# Patient Record
Sex: MALE | Race: WHITE | NOT HISPANIC OR LATINO | Employment: FULL TIME | URBAN - METROPOLITAN AREA
[De-identification: names, ages, dates, MRNs, and addresses within clinical notes are randomized per-mention and may not be internally consistent; named-entity substitution may affect disease eponyms.]

---

## 2024-07-23 ENCOUNTER — OFFICE VISIT (OUTPATIENT)
Dept: INTERNAL MEDICINE | Facility: CLINIC | Age: 34
End: 2024-07-23
Payer: COMMERCIAL

## 2024-07-23 VITALS
TEMPERATURE: 97 F | HEART RATE: 79 BPM | HEIGHT: 75 IN | WEIGHT: 315 LBS | DIASTOLIC BLOOD PRESSURE: 79 MMHG | OXYGEN SATURATION: 95 % | SYSTOLIC BLOOD PRESSURE: 138 MMHG | RESPIRATION RATE: 20 BRPM | BODY MASS INDEX: 39.17 KG/M2

## 2024-07-23 DIAGNOSIS — I10 HYPERTENSION, UNSPECIFIED TYPE: Primary | ICD-10-CM

## 2024-07-23 DIAGNOSIS — M54.41 CHRONIC RIGHT-SIDED LOW BACK PAIN WITH BILATERAL SCIATICA: ICD-10-CM

## 2024-07-23 DIAGNOSIS — G89.29 CHRONIC RIGHT-SIDED LOW BACK PAIN WITH BILATERAL SCIATICA: ICD-10-CM

## 2024-07-23 DIAGNOSIS — M54.42 CHRONIC RIGHT-SIDED LOW BACK PAIN WITH BILATERAL SCIATICA: ICD-10-CM

## 2024-07-23 DIAGNOSIS — E66.01 CLASS 3 SEVERE OBESITY WITH SERIOUS COMORBIDITY AND BODY MASS INDEX (BMI) OF 60.0 TO 69.9 IN ADULT, UNSPECIFIED OBESITY TYPE: ICD-10-CM

## 2024-07-23 DIAGNOSIS — Z00.00 ROUTINE ADULT HEALTH MAINTENANCE: ICD-10-CM

## 2024-07-23 DIAGNOSIS — M25.511 ACUTE PAIN OF RIGHT SHOULDER: ICD-10-CM

## 2024-07-23 DIAGNOSIS — M79.89 LEG SWELLING: ICD-10-CM

## 2024-07-23 PROBLEM — N35.919 URETHRAL STRICTURE IN MALE: Status: ACTIVE | Noted: 2021-07-21

## 2024-07-23 PROBLEM — M54.50 CHRONIC RIGHT-SIDED LOW BACK PAIN WITHOUT SCIATICA: Status: ACTIVE | Noted: 2021-10-25

## 2024-07-23 PROBLEM — Z87.442 PERSONAL HISTORY OF KIDNEY STONES: Status: ACTIVE | Noted: 2021-03-29

## 2024-07-23 PROCEDURE — 4010F ACE/ARB THERAPY RXD/TAKEN: CPT | Mod: CPTII,S$GLB,, | Performed by: FAMILY MEDICINE

## 2024-07-23 PROCEDURE — 3075F SYST BP GE 130 - 139MM HG: CPT | Mod: CPTII,S$GLB,, | Performed by: FAMILY MEDICINE

## 2024-07-23 PROCEDURE — 99999 PR PBB SHADOW E&M-NEW PATIENT-LVL V: CPT | Mod: PBBFAC,,, | Performed by: FAMILY MEDICINE

## 2024-07-23 PROCEDURE — 99204 OFFICE O/P NEW MOD 45 MIN: CPT | Mod: S$GLB,,, | Performed by: FAMILY MEDICINE

## 2024-07-23 PROCEDURE — 3078F DIAST BP <80 MM HG: CPT | Mod: CPTII,S$GLB,, | Performed by: FAMILY MEDICINE

## 2024-07-23 PROCEDURE — 3008F BODY MASS INDEX DOCD: CPT | Mod: CPTII,S$GLB,, | Performed by: FAMILY MEDICINE

## 2024-07-23 PROCEDURE — 1159F MED LIST DOCD IN RCRD: CPT | Mod: CPTII,S$GLB,, | Performed by: FAMILY MEDICINE

## 2024-07-23 PROCEDURE — 3044F HG A1C LEVEL LT 7.0%: CPT | Mod: CPTII,S$GLB,, | Performed by: FAMILY MEDICINE

## 2024-07-23 PROCEDURE — G2211 COMPLEX E/M VISIT ADD ON: HCPCS | Mod: S$GLB,,, | Performed by: FAMILY MEDICINE

## 2024-07-23 RX ORDER — GABAPENTIN 300 MG/1
300 CAPSULE ORAL 3 TIMES DAILY
Qty: 90 CAPSULE | Refills: 1 | Status: SHIPPED | OUTPATIENT
Start: 2024-07-23 | End: 2025-07-23

## 2024-07-23 RX ORDER — OLMESARTAN MEDOXOMIL 20 MG/1
20 TABLET ORAL DAILY
COMMUNITY

## 2024-07-23 RX ORDER — HYDROCODONE BITARTRATE AND ACETAMINOPHEN 10; 325 MG/1; MG/1
1 TABLET ORAL EVERY 8 HOURS PRN
Qty: 24 TABLET | Refills: 0 | Status: SHIPPED | OUTPATIENT
Start: 2024-07-23

## 2024-07-23 RX ORDER — METHOCARBAMOL 500 MG/1
500 TABLET, FILM COATED ORAL DAILY PRN
COMMUNITY

## 2024-07-23 RX ORDER — TIRZEPATIDE 2.5 MG/.5ML
2.5 INJECTION, SOLUTION SUBCUTANEOUS
Qty: 4 PEN | Refills: 1 | Status: SHIPPED | OUTPATIENT
Start: 2024-07-23

## 2024-07-24 ENCOUNTER — HOSPITAL ENCOUNTER (OUTPATIENT)
Dept: RADIOLOGY | Facility: HOSPITAL | Age: 34
Discharge: HOME | End: 2024-07-24
Attending: FAMILY MEDICINE
Payer: COMMERCIAL

## 2024-07-24 DIAGNOSIS — M54.42 CHRONIC RIGHT-SIDED LOW BACK PAIN WITH BILATERAL SCIATICA: ICD-10-CM

## 2024-07-24 DIAGNOSIS — G89.29 CHRONIC RIGHT-SIDED LOW BACK PAIN WITH BILATERAL SCIATICA: ICD-10-CM

## 2024-07-24 DIAGNOSIS — M25.511 ACUTE PAIN OF RIGHT SHOULDER: ICD-10-CM

## 2024-07-24 DIAGNOSIS — M54.41 CHRONIC RIGHT-SIDED LOW BACK PAIN WITH BILATERAL SCIATICA: ICD-10-CM

## 2024-07-24 PROCEDURE — 73030 X-RAY EXAM OF SHOULDER: CPT | Mod: 26,RT,, | Performed by: RADIOLOGY

## 2024-07-24 PROCEDURE — 72110 X-RAY EXAM L-2 SPINE 4/>VWS: CPT | Mod: TC,PO

## 2024-07-24 PROCEDURE — 72110 X-RAY EXAM L-2 SPINE 4/>VWS: CPT | Mod: 26,,, | Performed by: RADIOLOGY

## 2024-07-24 PROCEDURE — 73030 X-RAY EXAM OF SHOULDER: CPT | Mod: TC,PO,RT

## 2024-07-25 NOTE — PROGRESS NOTES
Subjective:      Patient ID: Wero Tovar is a 33 y.o. male.    Chief Complaint: Establish Care      Patient here today to establish care.  History of hypertension-controlled on current medications.  He reports long history of chronic lower back pain, worse in the right.  No definitive trauma or injury to the area.  Also history of pain in right shoulder with certain movements.  Reports recent leg swelling.  Also patient reports would like assistance with losing weight-previous PCP had referred him to bariatric clinic however there is a year and a half wait for this.  Has never been on medications at all for this.      Review of Systems   Constitutional:  Negative for activity change, appetite change and unexpected weight change.   Respiratory:  Negative for shortness of breath.    Cardiovascular:  Negative for leg swelling.   Gastrointestinal:  Negative for abdominal pain.   Musculoskeletal:  Positive for arthralgias and back pain.     Past Medical History:   Diagnosis Date    Allergy     Anxiety           Past Surgical History:   Procedure Laterality Date    COLONOSCOPY      FRACTURE SURGERY       Family History   Problem Relation Name Age of Onset    Diabetes Mother      Cancer Father      Hypertension Brother      Hypertension Maternal Grandmother      Diabetes Maternal Grandmother       Social History     Socioeconomic History    Marital status: Single   Tobacco Use    Smoking status: Former     Types: Cigarettes     Passive exposure: Never    Smokeless tobacco: Never   Substance and Sexual Activity    Alcohol use: Yes    Drug use: Never    Sexual activity: Not Currently     Social Determinants of Health     Food Insecurity: No Food Insecurity (6/17/2024)    Received from [facility] and Its Subsidiaries and Affiliates    Hunger Vital Sign     Worried About Running Out of Food in the Last Year: Never true     Ran Out of Food in the Last Year: Never true   Transportation  "Needs: No Transportation Needs (6/17/2024)    Received from [facility] and Its Subsidiaries and Affiliates    PRAPARE - Transportation     Lack of Transportation (Medical): No     Lack of Transportation (Non-Medical): No   Stress: No Stress Concern Present (6/17/2024)    Received from [facility] Its Subsidiaries and Affiliates    [employer] [place] - Occupational Stress Questionnaire     Feeling of Stress : Not at all     Review of patient's allergies indicates:   Allergen Reactions    Opioids - morphine analogues Anaphylaxis    Morphine Other (See Comments)     Feeling hot all over and shaking. Patient would rather not have medication       Objective:       /79 (BP Location: Left arm, Patient Position: Sitting, BP Method: Large (Automatic))   Pulse 79   Temp 97.4 °F (36.3 °C) (Tympanic)   Resp 20   Ht 6' 3" (1.905 m)   Wt (!) 229.8 kg (506 lb 9.9 oz)   SpO2 95%   BMI 63.32 kg/m²   Physical Exam  Vitals reviewed.   Constitutional:       General: He is not in acute distress.     Appearance: Normal appearance. He is well-developed. He is obese. He is not ill-appearing or diaphoretic.   HENT:      Head: Normocephalic.      Right Ear: Hearing, tympanic membrane, ear canal and external ear normal.      Left Ear: Hearing, tympanic membrane, ear canal and external ear normal.      Nose: Nose normal.      Right Sinus: No maxillary sinus tenderness or frontal sinus tenderness.      Left Sinus: No maxillary sinus tenderness or frontal sinus tenderness.      Mouth/Throat:      Pharynx: Uvula midline. No oropharyngeal exudate.   Eyes:      Conjunctiva/sclera: Conjunctivae normal.      Pupils: Pupils are equal, round, and reactive to light.   Cardiovascular:      Rate and Rhythm: Normal rate and regular rhythm.   Pulmonary:      Effort: Pulmonary effort is normal. No respiratory distress.      Breath sounds: " Normal breath sounds.   Abdominal:      General: Bowel sounds are normal.      Palpations: Abdomen is soft.      Tenderness: There is no abdominal tenderness. There is no guarding.      Hernia: No hernia is present.   Musculoskeletal:         General: Normal range of motion.      Cervical back: Normal range of motion and neck supple.      Right lower leg: Edema present.      Left lower leg: Edema present.      Comments: Some mild venous stasis changes   Skin:     General: Skin is warm and dry.      Capillary Refill: Capillary refill takes less than 2 seconds.   Neurological:      General: No focal deficit present.      Mental Status: He is alert and oriented to person, place, and time.   Psychiatric:         Mood and Affect: Mood normal.         Behavior: Behavior normal.         Thought Content: Thought content normal.         Judgment: Judgment normal.       Assessment:     1. Hypertension, unspecified type    2. Chronic right-sided low back pain with bilateral sciatica    3. Class 3 severe obesity with serious comorbidity and body mass index (BMI) of 60.0 to 69.9 in adult, unspecified obesity type    4. Routine adult health maintenance    5. Leg swelling    6. Acute pain of right shoulder      Plan:   Hypertension, unspecified type    Chronic right-sided low back pain with bilateral sciatica  -     X-Ray Lumbar Spine 5 View; Future; Expected date: 07/23/2024    Class 3 severe obesity with serious comorbidity and body mass index (BMI) of 60.0 to 69.9 in adult, unspecified obesity type  -     tirzepatide, weight loss, (ZEPBOUND) 2.5 mg/0.5 mL PnIj; Inject 2.5 mg into the skin every 7 days.  Dispense: 4 Pen; Refill: 1  -     Ambulatory referral/consult to Nutrition Services; Future; Expected date: 07/30/2024    Routine adult health maintenance  -     CBC Auto Differential; Future; Expected date: 07/23/2024  -     Comprehensive Metabolic Panel; Future; Expected date: 07/23/2024  -     Lipid Panel; Future; Expected  date: 07/23/2024  -     Hemoglobin A1C; Future; Expected date: 07/23/2024  -     TSH; Future; Expected date: 07/23/2024  -     T4, Free; Future; Expected date: 10/23/2024  -     TESTOSTERONE PANEL; Future; Expected date: 07/23/2024    Leg swelling  -     B-TYPE NATRIURETIC PEPTIDE; Future; Expected date: 07/23/2024    Acute pain of right shoulder  -     X-Ray Shoulder Trauma 3 view Right; Future; Expected date: 07/23/2024    Other orders  -     HYDROcodone-acetaminophen (NORCO)  mg per tablet; Take 1 tablet by mouth every 8 (eight) hours as needed for Pain.  Dispense: 24 tablet; Refill: 0  -     gabapentin (NEURONTIN) 300 MG capsule; Take 1 capsule (300 mg total) by mouth 3 (three) times daily.  Dispense: 90 capsule; Refill: 1    Hydrocodone to take only when needed-discussed could not give chronic medication for this  Will have above labs drawn when fasting  Will return for x-ray when available    Medication List with Changes/Refills   New Medications    GABAPENTIN (NEURONTIN) 300 MG CAPSULE    Take 1 capsule (300 mg total) by mouth 3 (three) times daily.    HYDROCODONE-ACETAMINOPHEN (NORCO)  MG PER TABLET    Take 1 tablet by mouth every 8 (eight) hours as needed for Pain.    TIRZEPATIDE, WEIGHT LOSS, (ZEPBOUND) 2.5 MG/0.5 ML PNIJ    Inject 2.5 mg into the skin every 7 days.   Current Medications    METHOCARBAMOL (ROBAXIN) 500 MG TAB    Take 500 mg by mouth daily as needed.    OLMESARTAN (BENICAR) 20 MG TABLET    Take 20 mg by mouth once daily.

## 2024-07-31 ENCOUNTER — NUTRITION (OUTPATIENT)
Dept: INTERNAL MEDICINE | Facility: CLINIC | Age: 34
End: 2024-07-31
Payer: COMMERCIAL

## 2024-07-31 DIAGNOSIS — E66.01 CLASS 3 SEVERE OBESITY WITH SERIOUS COMORBIDITY AND BODY MASS INDEX (BMI) OF 60.0 TO 69.9 IN ADULT, UNSPECIFIED OBESITY TYPE: ICD-10-CM

## 2024-07-31 PROCEDURE — 97802 MEDICAL NUTRITION INDIV IN: CPT | Mod: S$GLB,,, | Performed by: DIETITIAN, REGISTERED

## 2024-07-31 NOTE — PROGRESS NOTES
Nutrition Assessment  Session Time:  60 minutes      Client name:  Wero Tovar  :  1990  Age:  33 y.o.  Gender:  male    Client states:  referred by Julia Snowden MD with a diagnosis of:   -Class 3 severe obesity with serious comorbidity and body mass index (BMI) of 60.0 to 69.9 in adult, unspecified obesity type     Reports wanting to lose weight.     Everyone in family is large.  Self and brother included.  Various attempts with weight loss in the past.  Successful attempts but diet did not stick: Optavia, whole 30, numerous books read, Weight Watchers, premade Factor meals    Consistent issue with: weight will start to come off for 2-3 months then plateau no matter what.  [id number] worked at [employer]. Was moving 10,000 lbs of products per day + working out at home. Mindful of foods. Still could not drop below 375-378 lbs for 1.5 year.     Currently: no program. Mindful of intake. No sodas. Mainly water. Meats + vegetables mostly when being good with diet.   Was previously referred to a weight loss program, but there was a long weight list.     Currently at highest weight. Was also this weight in 2018.     Ultimate goal: 250-280 lbs. 1st goal 330-350 lbs    Sample day:  Breakfast: cafeteria at work: 1 biscuit + 2 sausage patties + smoked sausage + 1 cup coffee (1 Tbsp sugar, 4 Tbsp creamer pods)  Lunch: cafeteria: baked chicken and green beans with roll// may skip  Dinner 6pm: intake varies// spaghetti (pasta, meat sauce) + 3 italian sausage// hamburger steak + green beans  Snacks after dinner: 9-10pm: bowl of cereal (raisin bran)// twix or other candy bar// pb and jelly sandwich with a  glass of milk  Drinks: water    When off track with good choices: 2 links Boudin + hoghead cheese + 6-8 crackers, fried chicken, ramen, pizza 1/2 dominos + maybe garlic knots    Alcohol: very little      Exercise: steps vary per day with work activity//  Unable to workout over the last 2-3 months due to last  "procedure.  Has workout bench + weights at home. Various exercises can be performed.  Has access to gym at work.     Anthropometrics  Height:  75"     Weight:  506 lbs  BMI:  63.4  % Body Fat:  n/a     RECENT WEIGHTS      Weight (lb) Weight (kg) BMI (Calculated)   7/23/2024 506.62 (H)  229.8 (H)  63.3       Legend:  (H) High    Clinical Signs/Symptoms  N/V/D:  none noted  Appetite:  good       Past Medical History:   Diagnosis Date    Allergy     Anxiety        Past Surgical History:   Procedure Laterality Date    COLONOSCOPY      FRACTURE SURGERY         Medications    has a current medication list which includes the following prescription(s): gabapentin, hydrocodone-acetaminophen, methocarbamol, olmesartan, and zepbound.    Vitamins, Minerals, and/or Supplements:  not discussed     Food/Medication Interactions:  Reviewed     Food Allergies or Intolerances:  NKFA noted in chart     Social History    Marital status:  Single  Employment:  yes    Social History     Tobacco Use    Smoking status: Former     Types: Cigarettes     Passive exposure: Never    Smokeless tobacco: Never   Substance Use Topics    Alcohol use: Yes        Lab Reports   Sodium   Date Value Ref Range Status   07/24/2024 141 136 - 145 mmol/L Final     Potassium   Date Value Ref Range Status   07/24/2024 4.7 3.5 - 5.1 mmol/L Final     Chloride   Date Value Ref Range Status   07/24/2024 107 95 - 110 mmol/L Final     CO2   Date Value Ref Range Status   07/24/2024 27 23 - 29 mmol/L Final     Glucose   Date Value Ref Range Status   07/24/2024 115 (H) 70 - 110 mg/dL Final     BUN   Date Value Ref Range Status   07/24/2024 9 6 - 20 mg/dL Final     Creatinine   Date Value Ref Range Status   07/24/2024 0.9 0.5 - 1.4 mg/dL Final     Calcium   Date Value Ref Range Status   07/24/2024 9.2 8.7 - 10.5 mg/dL Final     Total Protein   Date Value Ref Range Status   07/24/2024 6.7 6.0 - 8.4 g/dL Final     Albumin   Date Value Ref Range Status   07/24/2024 3.6 3.5 - " 5.2 g/dL Final     Total Bilirubin   Date Value Ref Range Status   07/24/2024 0.5 0.1 - 1.0 mg/dL Final     Comment:     For infants and newborns, interpretation of results should be based  on gestational age, weight and in agreement with clinical  observations.    Premature Infant recommended reference ranges:  Up to 24 hours.............<8.0 mg/dL  Up to 48 hours............<12.0 mg/dL  3-5 days..................<15.0 mg/dL  6-29 days.................<15.0 mg/dL       Alkaline Phosphatase   Date Value Ref Range Status   07/24/2024 91 55 - 135 U/L Final     AST   Date Value Ref Range Status   07/24/2024 20 10 - 40 U/L Final     ALT   Date Value Ref Range Status   07/24/2024 30 10 - 44 U/L Final     Anion Gap   Date Value Ref Range Status   07/24/2024 7 (L) 8 - 16 mmol/L Final      Lab Results   Component Value Date    WBC 11.88 07/24/2024    HGB 13.2 (L) 07/24/2024    HCT 43.1 07/24/2024    MCV 87 07/24/2024     07/24/2024        Lab Results   Component Value Date    CHOL 190 07/24/2024     Lab Results   Component Value Date    HDL 38 (L) 07/24/2024     Lab Results   Component Value Date    LDLCALC 128.6 07/24/2024     Lab Results   Component Value Date    TRIG 117 07/24/2024     Lab Results   Component Value Date    CHOLHDL 20.0 07/24/2024     Lab Results   Component Value Date    HGBA1C 5.6 07/24/2024     BP Readings from Last 1 Encounters:   07/23/24 138/79       Food History  Provided above    Exercise History:  provided above    Cultural/Spiritual/Personal Preferences:  None identified    Support System:  family/friends    State of Change:  Contemplation    Barriers to Change:  none    Diagnosis    obesity related to suspected excess energy intake  as evidenced by BMI 63.    Intervention    RMR (Method:  [place] St. Jeor):  3331 kcal  Activity Factor:  1.2    SANDRA:  3997 - 1500 = 2497 kcal    Goals:  1.  Keep food diary ([employer]) for 3-5 days to help determine baseline on intake. Send to dietitian  to review.  2.  increase water intake. Aim for 1/2 goal body weight (pounds) in ounces. Goal weight 350 lbs, water goal 175 oz  3. Follow up in 2 weeks to review food diary and move forward with nutrition education.     Nutrition Education  The following education was provided to the patient:  Discussed weight management.    Patient verbalized understanding of nutrition education and recommendations received.    Handouts Provided  none    Monitoring/Evaluation    Monitor the following:  Weight  BMI  Caloric intake  Labs:  lipid panel, HgbA1c    Follow Up Plan:  2 weeks

## 2024-08-08 ENCOUNTER — OFFICE VISIT (OUTPATIENT)
Dept: ORTHOPEDICS | Facility: CLINIC | Age: 34
End: 2024-08-08
Payer: COMMERCIAL

## 2024-08-08 ENCOUNTER — HOSPITAL ENCOUNTER (OUTPATIENT)
Dept: RADIOLOGY | Facility: HOSPITAL | Age: 34
Discharge: HOME | End: 2024-08-08
Attending: STUDENT IN AN ORGANIZED HEALTH CARE EDUCATION/TRAINING PROGRAM
Payer: COMMERCIAL

## 2024-08-08 VITALS — RESPIRATION RATE: 17 BRPM | BODY MASS INDEX: 39.17 KG/M2 | HEIGHT: 75 IN | WEIGHT: 315 LBS

## 2024-08-08 DIAGNOSIS — M25.562 LEFT KNEE PAIN, UNSPECIFIED CHRONICITY: ICD-10-CM

## 2024-08-08 DIAGNOSIS — M75.41 SUBACROMIAL IMPINGEMENT OF RIGHT SHOULDER: Primary | ICD-10-CM

## 2024-08-08 DIAGNOSIS — M25.511 ACUTE PAIN OF RIGHT SHOULDER: ICD-10-CM

## 2024-08-08 PROCEDURE — 1159F MED LIST DOCD IN RCRD: CPT | Mod: CPTII,S$GLB,, | Performed by: STUDENT IN AN ORGANIZED HEALTH CARE EDUCATION/TRAINING PROGRAM

## 2024-08-08 PROCEDURE — 3044F HG A1C LEVEL LT 7.0%: CPT | Mod: CPTII,S$GLB,, | Performed by: STUDENT IN AN ORGANIZED HEALTH CARE EDUCATION/TRAINING PROGRAM

## 2024-08-08 PROCEDURE — 73564 X-RAY EXAM KNEE 4 OR MORE: CPT | Mod: 26,LT,, | Performed by: RADIOLOGY

## 2024-08-08 PROCEDURE — 73564 X-RAY EXAM KNEE 4 OR MORE: CPT | Mod: TC,PO,LT

## 2024-08-08 PROCEDURE — 3008F BODY MASS INDEX DOCD: CPT | Mod: CPTII,S$GLB,, | Performed by: STUDENT IN AN ORGANIZED HEALTH CARE EDUCATION/TRAINING PROGRAM

## 2024-08-08 PROCEDURE — 99999 PR PBB SHADOW E&M-EST. PATIENT-LVL IV: CPT | Mod: PBBFAC,,, | Performed by: STUDENT IN AN ORGANIZED HEALTH CARE EDUCATION/TRAINING PROGRAM

## 2024-08-08 PROCEDURE — 4010F ACE/ARB THERAPY RXD/TAKEN: CPT | Mod: CPTII,S$GLB,, | Performed by: STUDENT IN AN ORGANIZED HEALTH CARE EDUCATION/TRAINING PROGRAM

## 2024-08-08 PROCEDURE — 97110 THERAPEUTIC EXERCISES: CPT | Mod: S$GLB,,, | Performed by: STUDENT IN AN ORGANIZED HEALTH CARE EDUCATION/TRAINING PROGRAM

## 2024-08-08 PROCEDURE — 99204 OFFICE O/P NEW MOD 45 MIN: CPT | Mod: 25,S$GLB,, | Performed by: STUDENT IN AN ORGANIZED HEALTH CARE EDUCATION/TRAINING PROGRAM

## 2024-08-08 RX ORDER — MELOXICAM 15 MG/1
15 TABLET ORAL DAILY
Qty: 30 TABLET | Refills: 1 | Status: SHIPPED | OUTPATIENT
Start: 2024-08-08

## 2024-08-14 ENCOUNTER — CLINICAL SUPPORT (OUTPATIENT)
Dept: INTERNAL MEDICINE | Facility: CLINIC | Age: 34
End: 2024-08-14
Payer: COMMERCIAL

## 2024-08-14 DIAGNOSIS — E66.01 CLASS 3 SEVERE OBESITY WITH SERIOUS COMORBIDITY AND BODY MASS INDEX (BMI) OF 60.0 TO 69.9 IN ADULT, UNSPECIFIED OBESITY TYPE: Primary | ICD-10-CM

## 2024-08-14 PROCEDURE — 97803 MED NUTRITION INDIV SUBSEQ: CPT | Mod: 95,,, | Performed by: DIETITIAN, REGISTERED

## 2024-08-14 NOTE — PROGRESS NOTES
"The patient location is: [place]  The chief complaint leading to consultation is: nutrition follow-up    Visit type: audiovisual    Face to Face time with patient: 56 minutes  66 minutes of total time spent on the encounter, which includes face to face time and non-face to face time preparing to see the patient (eg, review of tests), Obtaining and/or reviewing separately obtained history, Documenting clinical information in the electronic or other health record, Independently interpreting results (not separately reported) and communicating results to the patient/family/caregiver, or Care coordination (not separately reported).         Each patient to whom he or she provides medical services by telemedicine is:  (1) informed of the relationship between the physician and patient and the respective role of any other health care provider with respect to management of the patient; and (2) notified that he or she may decline to receive medical services by telemedicine and may withdraw from such care at any time.    Notes:   Nutrition Assessment        Client name:  Wero Tovar  :  1990  Age:  33 y.o.  Gender:  male    Client states:  present for follow-up.  Last seen: 2024    Completed food diary as requested and submitted for review.    Patient reports noticing a few things:  -MyFitnessPal recommended 3600 calories day and some days consumed under that amount. Days over the recommended was when we was had sweet options or consumed foods completely off track from healthy options    Patient wants to work on improving breakfast and snack options.   Would like to continue breakfast at work due to convenience.     Foods available at work for breakfast: grits, eggs (scrambled), oatmeal, dejesus, smoked sausage, zoltan sausage, turkey zoltan sausage, toast (white), burrito, biscuit     Has increased water intake. Previously 5 bottles per day, currently at 6-7.    Anthropometrics  Height:  75"     Weight:  503 " (self reported)   7-: 506 lbs (office)  BMI:  63  % Body Fat:  n/a    Clinical Signs/Symptoms  N/V/D:  none noted  Appetite:  good       Past Medical History:   Diagnosis Date    Allergy     Anxiety        Past Surgical History:   Procedure Laterality Date    COLONOSCOPY      FRACTURE SURGERY         Medications    has a current medication list which includes the following prescription(s): gabapentin, hydrocodone-acetaminophen, meloxicam, methocarbamol, olmesartan, and zepbound.    Vitamins, Minerals, and/or Supplements:  not discussed     Food/Medication Interactions:  Reviewed     Food Allergies or Intolerances:  NKFA noted in chart     Social History    Marital status:  Single  Employment:  yes    Social History     Tobacco Use    Smoking status: Former     Types: Cigarettes     Passive exposure: Never    Smokeless tobacco: Never   Substance Use Topics    Alcohol use: Yes        Lab Reports   Sodium   Date Value Ref Range Status   07/24/2024 141 136 - 145 mmol/L Final     Potassium   Date Value Ref Range Status   07/24/2024 4.7 3.5 - 5.1 mmol/L Final     Chloride   Date Value Ref Range Status   07/24/2024 107 95 - 110 mmol/L Final     CO2   Date Value Ref Range Status   07/24/2024 27 23 - 29 mmol/L Final     Glucose   Date Value Ref Range Status   07/24/2024 115 (H) 70 - 110 mg/dL Final     BUN   Date Value Ref Range Status   07/24/2024 9 6 - 20 mg/dL Final     Creatinine   Date Value Ref Range Status   07/24/2024 0.9 0.5 - 1.4 mg/dL Final     Calcium   Date Value Ref Range Status   07/24/2024 9.2 8.7 - 10.5 mg/dL Final     Total Protein   Date Value Ref Range Status   07/24/2024 6.7 6.0 - 8.4 g/dL Final     Albumin   Date Value Ref Range Status   07/24/2024 3.6 3.5 - 5.2 g/dL Final   07/24/2024 3.8 3.6 - 5.1 g/dL Final     Comment:     Test Performed at:  [facility]  [id number] [place] [address]  [id number]     JENARO Jernigan MD, PhD, AUGUSTINE       Total Bilirubin   Date  Value Ref Range Status   07/24/2024 0.5 0.1 - 1.0 mg/dL Final     Comment:     For infants and newborns, interpretation of results should be based  on gestational age, weight and in agreement with clinical  observations.    Premature Infant recommended reference ranges:  Up to 24 hours.............<8.0 mg/dL  Up to 48 hours............<12.0 mg/dL  3-5 days..................<15.0 mg/dL  6-29 days.................<15.0 mg/dL       Alkaline Phosphatase   Date Value Ref Range Status   07/24/2024 91 55 - 135 U/L Final     AST   Date Value Ref Range Status   07/24/2024 20 10 - 40 U/L Final     ALT   Date Value Ref Range Status   07/24/2024 30 10 - 44 U/L Final     Anion Gap   Date Value Ref Range Status   07/24/2024 7 (L) 8 - 16 mmol/L Final      Lab Results   Component Value Date    WBC 11.88 07/24/2024    HGB 13.2 (L) 07/24/2024    HCT 43.1 07/24/2024    MCV 87 07/24/2024     07/24/2024        Lab Results   Component Value Date    CHOL 190 07/24/2024     Lab Results   Component Value Date    HDL 38 (L) 07/24/2024     Lab Results   Component Value Date    LDLCALC 128.6 07/24/2024     Lab Results   Component Value Date    TRIG 117 07/24/2024     Lab Results   Component Value Date    CHOLHDL 20.0 07/24/2024     Lab Results   Component Value Date    HGBA1C 5.6 07/24/2024     BP Readings from Last 1 Encounters:   07/23/24 138/79       Food History  reviewed    Exercise History:  none    Cultural/Spiritual/Personal Preferences:  None identified    Support System:  family/friends    State of Change:  Contemplation    Barriers to Change:  none    Diagnosis    Obesity related to excess energy intake as evidenced by food diary, BMI 63.    Intervention    RMR (Method:  [place] St. Jeor):  3317 kcal  Activity Factor:  1.2    SANDRA:  3980 - 1500 = 2480 kcal    Goals:  1.  3000 calories per day (decrease of 500 calories based on current average daily intake of 3500)  2.  Change breakfast using suggestions discussed  3.  Change  snack choices using suggestions discussed + emailed  4. Continue to work towards water goal of 175 oz  5. Eliminate sugary beverage intake        Nutrition Education  The following education was provided to the patient:  Discussed weight management.  Suggested dietary modifications based on current dietary behaviors and individual food preferences.  Discussed sugary foods and/or beverages (potential health consequences of excessive sugar intake, ways to reduce sugar intake, healthy beverage alternatives, etc.).  Discussed importance of small behavior/habit changes in improving long-term adherence and sustainability.  Provided ongoing support, encouragement, and guidance toward improved health efforts.    Patient verbalized understanding of nutrition education and recommendations received.    Handouts Provided  none    Monitoring/Evaluation    Monitor the following:  Weight  BMI  Caloric intake      Follow Up Plan:  2-3 months

## 2024-08-27 ENCOUNTER — OFFICE VISIT (OUTPATIENT)
Dept: SPORTS MEDICINE | Facility: CLINIC | Age: 34
End: 2024-08-27
Payer: COMMERCIAL

## 2024-08-27 DIAGNOSIS — M17.12 PRIMARY OSTEOARTHRITIS OF LEFT KNEE: Primary | ICD-10-CM

## 2024-08-27 DIAGNOSIS — M23.304 DERANGEMENT OF MEDIAL MENISCUS OF LEFT KNEE: ICD-10-CM

## 2024-08-27 PROCEDURE — 20611 DRAIN/INJ JOINT/BURSA W/US: CPT | Mod: LT,S$GLB,, | Performed by: STUDENT IN AN ORGANIZED HEALTH CARE EDUCATION/TRAINING PROGRAM

## 2024-08-27 PROCEDURE — 3044F HG A1C LEVEL LT 7.0%: CPT | Mod: CPTII,S$GLB,, | Performed by: STUDENT IN AN ORGANIZED HEALTH CARE EDUCATION/TRAINING PROGRAM

## 2024-08-27 PROCEDURE — 1159F MED LIST DOCD IN RCRD: CPT | Mod: CPTII,S$GLB,, | Performed by: STUDENT IN AN ORGANIZED HEALTH CARE EDUCATION/TRAINING PROGRAM

## 2024-08-27 PROCEDURE — 99999 PR PBB SHADOW E&M-EST. PATIENT-LVL III: CPT | Mod: PBBFAC,,, | Performed by: STUDENT IN AN ORGANIZED HEALTH CARE EDUCATION/TRAINING PROGRAM

## 2024-08-27 PROCEDURE — 99214 OFFICE O/P EST MOD 30 MIN: CPT | Mod: 25,S$GLB,, | Performed by: STUDENT IN AN ORGANIZED HEALTH CARE EDUCATION/TRAINING PROGRAM

## 2024-08-27 PROCEDURE — 4010F ACE/ARB THERAPY RXD/TAKEN: CPT | Mod: CPTII,S$GLB,, | Performed by: STUDENT IN AN ORGANIZED HEALTH CARE EDUCATION/TRAINING PROGRAM

## 2024-08-27 RX ORDER — LIDOCAINE HYDROCHLORIDE 10 MG/ML
2 INJECTION, SOLUTION INFILTRATION; PERINEURAL
Status: DISCONTINUED | OUTPATIENT
Start: 2024-08-27 | End: 2024-08-27 | Stop reason: HOSPADM

## 2024-08-27 RX ORDER — BUPIVACAINE HYDROCHLORIDE 5 MG/ML
2 INJECTION, SOLUTION PERINEURAL
Status: DISCONTINUED | OUTPATIENT
Start: 2024-08-27 | End: 2024-08-27 | Stop reason: HOSPADM

## 2024-08-27 RX ORDER — TRIAMCINOLONE ACETONIDE 40 MG/ML
40 INJECTION, SUSPENSION INTRA-ARTICULAR; INTRAMUSCULAR
Status: DISCONTINUED | OUTPATIENT
Start: 2024-08-27 | End: 2024-08-27 | Stop reason: HOSPADM

## 2024-08-27 RX ADMIN — LIDOCAINE HYDROCHLORIDE 2 ML: 10 INJECTION, SOLUTION INFILTRATION; PERINEURAL at 08:08

## 2024-08-27 RX ADMIN — BUPIVACAINE HYDROCHLORIDE 2 ML: 5 INJECTION, SOLUTION PERINEURAL at 08:08

## 2024-08-27 RX ADMIN — TRIAMCINOLONE ACETONIDE 40 MG: 40 INJECTION, SUSPENSION INTRA-ARTICULAR; INTRAMUSCULAR at 08:08

## 2024-08-27 NOTE — PATIENT INSTRUCTIONS
Assessment:  Wero Tovar is a 34 y.o. male   Chief Complaint   Patient presents with    Left Knee - Pain       Encounter Diagnoses   Name Primary?    Primary osteoarthritis of left knee Yes    Derangement of medial meniscus of left knee         Plan:  Provided corticosteroid injection to left knee. We discussed the proper protocols after the injection such as no submerging pools, baths tubs, or hot tubs for 24 hr.  Showering is okay today.  We also discussed that blood sugars can be elevated after an injection and asked patient to properly checked her sugars over the next few days and contact their PCP if there are any concerns.  We discussed red flags such as fevers, chills, red, warm, tender joint at the area of injection to please seek medical care immediately.    An external, ambulatory referral to physical therapy was placed today. Please reach out to them to schedule your initial consult and subsequent visits.     Follow-up: 3 weeks or sooner if there are any problems between now and then.    Thank you for choosing [facility] and Dr. Ector Sarkar for your orthopedic & sports medicine care. It is our goal to provide you with exceptional care that will help keep you healthy, active, and get you back in the game.    Please do not hesitate to reach out to us via email, phone, or MyChart with any questions, concerns, or feedback.    If you felt that you received exemplary care today, please consider leaving us feedback on [employer] at:  [email or web address]    If you are experiencing pain/discomfort ,or have questions and would like to be connected to the [facility] on-call team, please call this number and specify which Sports Medicine provider is treating you: [phone]

## 2024-08-27 NOTE — PROCEDURES
Large Joint Aspiration/Injection: L knee    Date/Time: 8/27/2024 8:20 AM    Performed by: Ector Sarkar MD  Authorized by: Ector Sarkar MD    Consent Done?:  Yes (Verbal)  Indications:  Pain and joint swelling  Site marked: the procedure site was marked    Timeout: prior to procedure the correct patient, procedure, and site was verified    Prep: patient was prepped and draped in usual sterile fashion      Local anesthesia used?: Yes    Local anesthetic:  Topical anesthetic    Details:  Needle Size:  22 G  Ultrasonic Guidance for needle placement?: Yes    Images are saved and documented.  Approach: Superior lateral.  Location:  Knee  Site:  L knee  Medications:  40 mg triamcinolone acetonide 40 mg/mL; 2 mL LIDOcaine HCL 10 mg/ml (1%) 10 mg/mL (1 %); 2 mL BUPivacaine 0.5 % (5 mg/mL)  Patient tolerance:  Patient tolerated the procedure well with no immediate complications     Ultrasound guidance was used for needle localization. Images were saved and stored for documentation. The appropriate structures were visualized. Dynamic visualization of the needle was continuous throughout the procedures and maintained good position.     We discussed the proper protocols after the injection such as no submerging pools, baths tubs, or hot tubs for 24 hr.  Showering is okay today.  We also discussed that blood sugars can be elevated after an injection and asked patient to properly checked her sugars over the next few days and contact their PCP if there are any concerns.  We discussed red flags such as fevers, chills, red, warm, tender joint at the area of injection to please seek medical care immediately.

## 2024-08-27 NOTE — LETTER
Patient: Wero Tovar   YOB: 1990   Clinic Number: [id number]   Today's Date: August 27, 2024        Work Status Summary         Work Status: ABLE to work --- transitional duty (as follows): LIGHT Duty:    Therapy Recomendations: Physical Therapy 1-2 times a week for 6 weeks.         Next Appointment:  Wero will be seen by Dr Ector Sarkar                   August 27, 2024    Ector Sarkar MD

## 2024-08-27 NOTE — PROGRESS NOTES
Patient ID: Wero Tovar  YOB: 1990  MRN: [id number]    Chief Complaint: Pain of the Left Knee    History of Present Illness: Wero Tovar is a  34 y.o. male who is here for follow up evaluation of left knee.     The patient returns today reporting that the symptoms have persisted at his left knee. Was recently seen 3 weeks ago and given HEP and mobic. Feels the mobic is helpful with the inflammation but not pain. Has a burning and sharp pain at medial knee and along pes anserine.   Of note was put on levofloxacin following a procedure about 7 weeks ago. Had a day at work 3 weeks ago that started the increase in burning and sharp pains when he installed a  and crouch to stand a bunch of times.     To review his history, prior acute onset left knee pain without mechanism injury. Appears grossly stable today x-rays reviewed grossly normal as well     Past Medical History:   Past Medical History:   Diagnosis Date    Allergy     Anxiety      Past Surgical History:   Procedure Laterality Date    COLONOSCOPY      FRACTURE SURGERY       Family History   Problem Relation Name Age of Onset    Diabetes Mother      Cancer Father      Hypertension Brother      Hypertension Maternal Grandmother      Diabetes Maternal Grandmother       Social History     Socioeconomic History    Marital status: Single   Tobacco Use    Smoking status: Former     Types: Cigarettes     Passive exposure: Never    Smokeless tobacco: Never   Substance and Sexual Activity    Alcohol use: Yes    Drug use: Never    Sexual activity: Not Currently     Social Determinants of Health     Financial Resource Strain: Patient Declined (8/14/2024)    Overall Financial Resource Strain (CARDIA)     Difficulty of Paying Living Expenses: Patient declined   Food Insecurity: Patient Declined (8/14/2024)    Hunger Vital Sign     Worried About Running Out of Food in the Last Year: Patient declined     Ran Out of Food in the Last Year: Patient  declined   Transportation Needs: No Transportation Needs (6/17/2024)    Received from [facility] and [facility] Subsidiaries and Affiliates    PRAPARE - Transportation     Lack of Transportation (Medical): No     Lack of Transportation (Non-Medical): No   Physical Activity: Insufficiently Active (8/14/2024)    Exercise Vital Sign     Days of Exercise per Week: 2 days     Minutes of Exercise per Session: 20 min   Stress: Patient Declined (8/14/2024)    [employer] [place] [employer] - Occupational Stress Questionnaire     Feeling of Stress : Patient declined   Housing Stability: Unknown (8/14/2024)    Housing Stability Vital Sign     Unable to Pay for Housing in the Last Year: Patient declined     Medication List with Changes/Refills   Current Medications    GABAPENTIN (NEURONTIN) 300 MG CAPSULE    Take 1 capsule (300 mg total) by mouth 3 (three) times daily.    HYDROCODONE-ACETAMINOPHEN (NORCO)  MG PER TABLET    Take 1 tablet by mouth every 8 (eight) hours as needed for Pain.    MELOXICAM (MOBIC) 15 MG TABLET    Take 1 tablet (15 mg total) by mouth once daily.    METHOCARBAMOL (ROBAXIN) 500 MG TAB    Take 500 mg by mouth daily as needed.    OLMESARTAN (BENICAR) 20 MG TABLET    Take 20 mg by mouth once daily.    TIRZEPATIDE, WEIGHT LOSS, (ZEPBOUND) 2.5 MG/0.5 ML PNIJ    Inject 2.5 mg into the skin every 7 days.     Review of patient's allergies indicates:   Allergen Reactions    Opioids - morphine analogues Anaphylaxis    Morphine Other (See Comments)     Feeling hot all over and shaking. Patient would rather not have medication       Physical Exam:   There is no height or weight on file to calculate BMI.    GENERAL: Well appearing, in no acute distress.  HEAD: Normocephalic and atraumatic.  ENT: External ears and nose grossly normal.  EYES: EOMI bilaterally  PULMONARY: Respirations are grossly even and non-labored.  NEURO: Awake, alert, and oriented x 3.  SKIN: No  obvious rashes appreciated.  PSYCH: Mood & affect are appropriate.    Detailed MSK exam:     No large effusion good patellar mobility tenderness over the medial joint line medial femoral condyle negative varus valgus stress negative anterior posterior drawer negative Lachman's.  Equivocal Stormy's antalgic gait    Imaging:    No new images.     Assessment:  Wero Tovar is a 34 y.o. male presents today for worsening left medial knee pain.  We discussed due to the worsening pain he is having possible degenerative meniscus versus early cartilage damage inside his medial joint line.  We also discussed management at this time he has been taking pain medicine as well as anti-inflammatories and seeing no significant relief.  I discussed previous x-rays and discussed moving forward with intra-articular CSI as he is having worsening symptoms.  Light duty written for today follow-up in 3 weeks and central.    Primary osteoarthritis of left knee  -     Ambulatory referral/consult to Physical/Occupational Therapy; Future; Expected date: 09/03/2024  -     Sports Medicine US - Guidance for Needle Placement    Derangement of medial meniscus of left knee           Ector Sarkar MD    Disclaimer: This note was prepared using a voice recognition system and is likely to have sound alike errors within the text.

## 2024-09-08 ENCOUNTER — PATIENT MESSAGE (OUTPATIENT)
Dept: SPORTS MEDICINE | Facility: CLINIC | Age: 34
End: 2024-09-08
Payer: COMMERCIAL

## 2024-09-26 ENCOUNTER — OFFICE VISIT (OUTPATIENT)
Dept: ORTHOPEDICS | Facility: CLINIC | Age: 34
End: 2024-09-26
Payer: COMMERCIAL

## 2024-09-26 DIAGNOSIS — M17.12 PRIMARY OSTEOARTHRITIS OF LEFT KNEE: Primary | ICD-10-CM

## 2024-09-26 DIAGNOSIS — M75.41 SUBACROMIAL IMPINGEMENT OF RIGHT SHOULDER: ICD-10-CM

## 2024-09-26 PROCEDURE — 4010F ACE/ARB THERAPY RXD/TAKEN: CPT | Mod: CPTII,S$GLB,, | Performed by: STUDENT IN AN ORGANIZED HEALTH CARE EDUCATION/TRAINING PROGRAM

## 2024-09-26 PROCEDURE — 3044F HG A1C LEVEL LT 7.0%: CPT | Mod: CPTII,S$GLB,, | Performed by: STUDENT IN AN ORGANIZED HEALTH CARE EDUCATION/TRAINING PROGRAM

## 2024-09-26 PROCEDURE — G2211 COMPLEX E/M VISIT ADD ON: HCPCS | Mod: S$GLB,,, | Performed by: STUDENT IN AN ORGANIZED HEALTH CARE EDUCATION/TRAINING PROGRAM

## 2024-09-26 PROCEDURE — 99999 PR PBB SHADOW E&M-EST. PATIENT-LVL I: CPT | Mod: PBBFAC,,, | Performed by: STUDENT IN AN ORGANIZED HEALTH CARE EDUCATION/TRAINING PROGRAM

## 2024-09-26 PROCEDURE — 99214 OFFICE O/P EST MOD 30 MIN: CPT | Mod: S$GLB,,, | Performed by: STUDENT IN AN ORGANIZED HEALTH CARE EDUCATION/TRAINING PROGRAM

## 2024-09-26 NOTE — PROGRESS NOTES
Patient ID: Wero Tovar  YOB: 1990  MRN: [id number]    Chief Complaint: Pain and Follow-up of the Left Knee and Follow-up of the Right Shoulder    History of Present Illness: Wero Tovar is a right-hand dominant 34 y.o. male who is here for follow up evaluation of left knee and right shoulder.     To review his history,  persistent left knee symptoms after acute onset left knee pain without mechanism injury. Has tried corticosteroid injection (8/27/2024), given HEP and referred to formal PT at [facility], and mobic. Feels the mobic is helpful. Still has a burning and sharp pain at medial knee and along pes anserine. Pain ebbs and flows without clear causative factor.  Currently on last refill of his Mobic. Right shoulder pain  for few months consistent with some anterior impingement that has improved. Was chronic in nature and there was a specific mechanism. Started after helping a friend move a heavy toolbox and fel the pain begin little bit after.     Past Medical History:   Past Medical History:   Diagnosis Date    Allergy     Anxiety      Past Surgical History:   Procedure Laterality Date    COLONOSCOPY      FRACTURE SURGERY       Family History   Problem Relation Name Age of Onset    Diabetes Mother      Cancer Father      Hypertension Brother      Hypertension Maternal Grandmother      Diabetes Maternal Grandmother       Social History     Socioeconomic History    Marital status: Single   Tobacco Use    Smoking status: Former     Types: Cigarettes     Passive exposure: Never    Smokeless tobacco: Never   Substance and Sexual Activity    Alcohol use: Yes    Drug use: Never    Sexual activity: Not Currently     Social Determinants of Health     Financial Resource Strain: Patient Declined (8/14/2024)    Overall Financial Resource Strain (CARDIA)     Difficulty of Paying Living Expenses: Patient declined   Food Insecurity: Patient Declined (8/14/2024)    Hunger Vital Sign     Worried  About Running Out of Food in the Last Year: Patient declined     Ran Out of Food in the Last Year: Patient declined   Transportation Needs: No Transportation Needs (6/17/2024)    Received from [facility] and Its Subsidiaries and Affiliates    [place]ER[place] - Transportation     Lack of Transportation (Medical): No     Lack of Transportation (Non-Medical): No   Physical Activity: Insufficiently Active (8/14/2024)    Exercise Vital Sign     Days of Exercise per Week: 2 days     Minutes of Exercise per Session: 20 min   Stress: Patient Declined (8/14/2024)    [employer] [place] [employer] - Occupational Stress Questionnaire     Feeling of Stress : Patient declined   Housing Stability: Unknown (8/14/2024)    Housing Stability Vital Sign     Unable to Pay for Housing in the Last Year: Patient declined     Medication List with Changes/Refills   Current Medications    GABAPENTIN (NEURONTIN) 300 MG CAPSULE    Take 1 capsule (300 mg total) by mouth 3 (three) times daily.    HYDROCODONE-ACETAMINOPHEN (NORCO)  MG PER TABLET    Take 1 tablet by mouth every 8 (eight) hours as needed for Pain.    MELOXICAM (MOBIC) 15 MG TABLET    Take 1 tablet (15 mg total) by mouth once daily.    METHOCARBAMOL (ROBAXIN) 500 MG TAB    Take 500 mg by mouth daily as needed.    OLMESARTAN (BENICAR) 20 MG TABLET    Take 20 mg by mouth once daily.    TIRZEPATIDE, WEIGHT LOSS, (ZEPBOUND) 2.5 MG/0.5 ML PNIJ    Inject 2.5 mg into the skin every 7 days.     Review of patient's allergies indicates:   Allergen Reactions    Opioids - morphine analogues Anaphylaxis    Morphine Other (See Comments)     Feeling hot all over and shaking. Patient would rather not have medication       Physical Exam:   There is no height or weight on file to calculate BMI.    GENERAL: Well appearing, in no acute distress.  HEAD: Normocephalic and atraumatic.  ENT: External ears and nose grossly normal.  EYES: EOMI  bilaterally  PULMONARY: Respirations are grossly even and non-labored.  NEURO: Awake, alert, and oriented x 3.  SKIN: No obvious rashes appreciated.  PSYCH: Mood & affect are appropriate.    Detailed MSK exam:     L knee: tender medial joint line, crepitus with ROM    R shoulder: full ROM, negative impingement, good strength    Imaging:    No new images.     Assessment:  Wero Tovar is a 34 y.o. male   Presents today for follow-up for left knee pain.  Discussed the diagnosis prognosis as well as conservative treatment options moving forward for primary osteoarthritis of the knee.  We did discuss today extensively about lifestyle management weight loss and exercise.  He has recently lost about 10-15 lb.  I discussed and congratulated him on continuing weight loss as well.  We also discussed continued PT as he just started last week and potential [facility] in the future if indicated.  He will send us a message on my chart over the next 2-3 weeks to let us know and we can move forward if indicated.  Otherwise see him in 3 months for follow-up.    Today's visit is associated with current or anticipated ongoing medical care related to this patient's diagnosis of osteoarthritis.  Currently there is no cure of osteoarthritis and the patient will require regular follow up to manage symptoms and progression.  The patient is to return to the office within a minimum of 3-6 months to review symptoms and function at that time.   CPT code       Primary osteoarthritis of left knee    Subacromial impingement of right shoulder           Ector Sarkar MD    Disclaimer: This note was prepared using a voice recognition system and is likely to have sound alike errors within the text.

## 2024-10-07 ENCOUNTER — TELEPHONE (OUTPATIENT)
Dept: SPORTS MEDICINE | Facility: CLINIC | Age: 34
End: 2024-10-07
Payer: COMMERCIAL

## 2024-10-07 DIAGNOSIS — M25.562 LEFT KNEE PAIN, UNSPECIFIED CHRONICITY: ICD-10-CM

## 2024-10-07 DIAGNOSIS — M25.511 ACUTE PAIN OF RIGHT SHOULDER: ICD-10-CM

## 2024-10-07 RX ORDER — MELOXICAM 15 MG/1
15 TABLET ORAL
Qty: 30 TABLET | Refills: 1 | Status: SHIPPED | OUTPATIENT
Start: 2024-10-07

## 2024-10-07 NOTE — TELEPHONE ENCOUNTER
----- Message from Aayush sent at 10/3/2024  3:34 PM CDT -----  Pt Note to Doctor    Who called: staff of [facility]  Best call back #: [phone](call) [phone] (fax)  Note: caller said they needs a copy of pt's 7/20/24 MRI

## 2024-10-15 NOTE — PROGRESS NOTES
The patient location is: [place]  The chief complaint leading to consultation is: nutrition follow-up    Visit type: audiovisual    Face to Face time with patient: 35 minutes  45 minutes of total time spent on the encounter, which includes face to face time and non-face to face time preparing to see the patient (eg, review of tests), Obtaining and/or reviewing separately obtained history, Documenting clinical information in the electronic or other health record, Independently interpreting results (not separately reported) and communicating results to the patient/family/caregiver, or Care coordination (not separately reported).         Each patient to whom he or she provides medical services by telemedicine is:  (1) informed of the relationship between the physician and patient and the respective role of any other health care provider with respect to management of the patient; and (2) notified that he or she may decline to receive medical services by telemedicine and may withdraw from such care at any time.    Notes:   Nutrition Assessment        Client name:  Wero Tovar  :  1990  Age:  34 y.o.  Gender:  male    Client states:  present for nutrition follow-up.  Last seen: 2024    Continues to work towards weight loss.  Began focusing on protein intake since last appointment.  Aims to consume 180-200 grams per day based on goal recommended through MyFitnessPal.  Achieves protein goal on most days, but falls short on others.  Notices a difference in hunger level when protein is met vs when it is not met.  Includes quest protein bar and Ensure Max Protein (blended with frozen fruit, peanut butter, or milk) in diet.    Mindful of carbohydrate intake. Reduced overall intake.  Mostly eliminated intake of rice.   Avoiding fast food mostly. Occasional raising canes but will not get toast or fries with meal.    Tracking 3000 calories daily.    Reports good water intake. Will add  [place] at times for  "flavor.  Since last appointment only had 2 sweet teas.    No breakfast lately due to knee problems (trouble getting to cafeteria).  Attending physical therapy.          Anthropometrics  Height:  75"     Weight:  486 lbs (self reported)   8-: 503 lbs (self reported)   7-: 506 lbs (office)  BMI:  60.9  % Body Fat:  n/a    Clinical Signs/Symptoms  N/V/D:  none noted  Appetite:  good       Past Medical History:   Diagnosis Date    Allergy     Anxiety        Past Surgical History:   Procedure Laterality Date    COLONOSCOPY      FRACTURE SURGERY         Medications    has a current medication list which includes the following prescription(s): gabapentin, hydrocodone-acetaminophen, meloxicam, methocarbamol, olmesartan, and zepbound.    Vitamins, Minerals, and/or Supplements:  not discussed     Food/Medication Interactions:  Reviewed     Food Allergies or Intolerances:  NKFA noted in chart     Social History    Marital status:  Single  Employment:  yes    Social History     Tobacco Use    Smoking status: Former     Types: Cigarettes     Passive exposure: Never    Smokeless tobacco: Never   Substance Use Topics    Alcohol use: Yes        Lab Reports   Sodium   Date Value Ref Range Status   07/24/2024 141 136 - 145 mmol/L Final     Potassium   Date Value Ref Range Status   07/24/2024 4.7 3.5 - 5.1 mmol/L Final     Chloride   Date Value Ref Range Status   07/24/2024 107 95 - 110 mmol/L Final     CO2   Date Value Ref Range Status   07/24/2024 27 23 - 29 mmol/L Final     Glucose   Date Value Ref Range Status   07/24/2024 115 (H) 70 - 110 mg/dL Final     BUN   Date Value Ref Range Status   07/24/2024 9 6 - 20 mg/dL Final     Creatinine   Date Value Ref Range Status   07/24/2024 0.9 0.5 - 1.4 mg/dL Final     Calcium   Date Value Ref Range Status   07/24/2024 9.2 8.7 - 10.5 mg/dL Final     Total Protein   Date Value Ref Range Status   07/24/2024 6.7 6.0 - 8.4 g/dL Final     Albumin   Date Value Ref Range Status "   07/24/2024 3.6 3.5 - 5.2 g/dL Final   07/24/2024 3.8 3.6 - 5.1 g/dL Final     Comment:     Test Performed at:  [facility] [address]  [id number]     JENARO Jernigan MD, PhD, AUGUSTINE       Total Bilirubin   Date Value Ref Range Status   07/24/2024 0.5 0.1 - 1.0 mg/dL Final     Comment:     For infants and newborns, interpretation of results should be based  on gestational age, weight and in agreement with clinical  observations.    Premature Infant recommended reference ranges:  Up to 24 hours.............<8.0 mg/dL  Up to 48 hours............<12.0 mg/dL  3-5 days..................<15.0 mg/dL  6-29 days.................<15.0 mg/dL       Alkaline Phosphatase   Date Value Ref Range Status   07/24/2024 91 55 - 135 U/L Final     AST   Date Value Ref Range Status   07/24/2024 20 10 - 40 U/L Final     ALT   Date Value Ref Range Status   07/24/2024 30 10 - 44 U/L Final     Anion Gap   Date Value Ref Range Status   07/24/2024 7 (L) 8 - 16 mmol/L Final      Lab Results   Component Value Date    WBC 11.88 07/24/2024    HGB 13.2 (L) 07/24/2024    HCT 43.1 07/24/2024    MCV 87 07/24/2024     07/24/2024        Lab Results   Component Value Date    CHOL 190 07/24/2024     Lab Results   Component Value Date    HDL 38 (L) 07/24/2024     Lab Results   Component Value Date    LDLCALC 128.6 07/24/2024     Lab Results   Component Value Date    TRIG 117 07/24/2024     Lab Results   Component Value Date    CHOLHDL 20.0 07/24/2024     Lab Results   Component Value Date    HGBA1C 5.6 07/24/2024     BP Readings from Last 1 Encounters:   07/23/24 138/79       Food History  reviewed    Exercise History:  reviewed    Cultural/Spiritual/Personal Preferences:  None identified    Support System:  family/friends    State of Change:  Preparation    Barriers to Change:  none    Diagnosis    obesity related to previous excess energy intake as evidenced by BMI 60.    Intervention    RMR  (Method:  [place] [place]):  3235 kcal  Activity Factor:  1.2    SANDRA:  3882 - 1500 = 2382 kcal    Goals:  1.  Continue current routine.   2.  Consider switching from pre made protein supplement to a protein powder to save money.      Nutrition Education  The following education was provided to the patient:  Complimented patient on dietary compliance/modifications and resulting health improvements.  Discussed weight management.  Suggested dietary modifications based on current dietary behaviors and individual food preferences.  Discussed importance of small behavior/habit changes in improving long-term adherence and sustainability.  Provided ongoing support, encouragement, and guidance toward improved health efforts.    Patient verbalized understanding of nutrition education and recommendations received.    Handouts Provided  none    Monitoring/Evaluation    Monitor the following:  Weight  BMI  Caloric intake      Follow Up Plan:  2-3 months

## 2024-10-16 ENCOUNTER — CLINICAL SUPPORT (OUTPATIENT)
Dept: INTERNAL MEDICINE | Facility: CLINIC | Age: 34
End: 2024-10-16
Payer: COMMERCIAL

## 2024-10-16 DIAGNOSIS — E66.813 CLASS 3 SEVERE OBESITY WITH SERIOUS COMORBIDITY AND BODY MASS INDEX (BMI) OF 60.0 TO 69.9 IN ADULT, UNSPECIFIED OBESITY TYPE: Primary | ICD-10-CM

## 2024-10-16 DIAGNOSIS — E66.01 CLASS 3 SEVERE OBESITY WITH SERIOUS COMORBIDITY AND BODY MASS INDEX (BMI) OF 60.0 TO 69.9 IN ADULT, UNSPECIFIED OBESITY TYPE: Primary | ICD-10-CM

## 2024-10-30 RX ORDER — GABAPENTIN 300 MG/1
300 CAPSULE ORAL 3 TIMES DAILY
Qty: 90 CAPSULE | Refills: 1 | Status: SHIPPED | OUTPATIENT
Start: 2024-10-30

## 2024-12-10 NOTE — PROGRESS NOTES
The patient location is: [place]  The chief complaint leading to consultation is: nutrition follow up    Visit type: audiovisual    Face to Face time with patient: 28 minutes  33 minutes of total time spent on the encounter, which includes face to face time and non-face to face time preparing to see the patient (eg, review of tests), Obtaining and/or reviewing separately obtained history, Documenting clinical information in the electronic or other health record, Independently interpreting results (not separately reported) and communicating results to the patient/family/caregiver, or Care coordination (not separately reported).         Each patient to whom he or she provides medical services by telemedicine is:  (1) informed of the relationship between the physician and patient and the respective role of any other health care provider with respect to management of the patient; and (2) notified that he or she may decline to receive medical services by telemedicine and may withdraw from such care at any time.    Notes:   Nutrition Assessment        Client name:  Wero Tovar  :  1990  Age:  34 y.o.  Gender:  male    Client states:  present for nutrition follow-up.  Last seen: 10-    Continues to work towards weight loss.   Weight loss slowed down some recently mainly due to Thanksgiving. Plenty of food + leftovers for several days.  Back on track now. Going pretty well.   Continues to not eat breakfast.     Purchased body fortress protein powder and powdered peanut butter. Drinking after dinner.   Meeting protein goals on most days. And continues to notice decreased hunger and ability to manage overall portions when protein is met.  Last week was short on protein and began eating more.     Physical therapy twice weekly, will continue until end of the year.  On days when not at therapy: weighted exercises, crunches  MRI since last appointment. Found out knee issue. Missing chunk of cartilage. Needs to  "be below 200 lbs in order to be considered for surgery.   Unable to walk or bike ride for exercise.    Trying to increase water intake.   Past week drank 1.5 bottle of root beer throughout the week. Only other beverage choice is water.  No fast food within the last 3 weeks.      Anthropometrics  Height:  75"     Weight:  480 lbs (self reported)   10-: 486 lbs (self reported)              8-: 503 lbs (self reported)              7-: 506 lbs (office)  BMI:  60.1      Clinical Signs/Symptoms  N/V/D:  none noted  Appetite:  good       Past Medical History:   Diagnosis Date    Allergy     Anxiety        Past Surgical History:   Procedure Laterality Date    COLONOSCOPY      FRACTURE SURGERY         Medications    has a current medication list which includes the following prescription(s): gabapentin, hydrocodone-acetaminophen, meloxicam, methocarbamol, olmesartan, and zepbound.    Vitamins, Minerals, and/or Supplements:  not discussed     Food/Medication Interactions:  Reviewed     Food Allergies or Intolerances:  NKFA noted in chart     Social History    Marital status:  Single  Employment:  yes    Social History     Tobacco Use    Smoking status: Former     Types: Cigarettes     Passive exposure: Never    Smokeless tobacco: Never   Substance Use Topics    Alcohol use: Yes        Lab Reports   Sodium   Date Value Ref Range Status   07/24/2024 141 136 - 145 mmol/L Final     Potassium   Date Value Ref Range Status   07/24/2024 4.7 3.5 - 5.1 mmol/L Final     Chloride   Date Value Ref Range Status   07/24/2024 107 95 - 110 mmol/L Final     CO2   Date Value Ref Range Status   07/24/2024 27 23 - 29 mmol/L Final     Glucose   Date Value Ref Range Status   07/24/2024 115 (H) 70 - 110 mg/dL Final     BUN   Date Value Ref Range Status   07/24/2024 9 6 - 20 mg/dL Final     Creatinine   Date Value Ref Range Status   07/24/2024 0.9 0.5 - 1.4 mg/dL Final     Calcium   Date Value Ref Range Status   07/24/2024 9.2 " 8.7 - 10.5 mg/dL Final     Total Protein   Date Value Ref Range Status   07/24/2024 6.7 6.0 - 8.4 g/dL Final     Albumin   Date Value Ref Range Status   07/24/2024 3.6 3.5 - 5.2 g/dL Final   07/24/2024 3.8 3.6 - 5.1 g/dL Final     Comment:     Test Performed at:  [facility]  [id number] [place] [address]  [id number]     JENARO Jernigan MD, PhD, AUGUSTINE       Total Bilirubin   Date Value Ref Range Status   07/24/2024 0.5 0.1 - 1.0 mg/dL Final     Comment:     For infants and newborns, interpretation of results should be based  on gestational age, weight and in agreement with clinical  observations.    Premature Infant recommended reference ranges:  Up to 24 hours.............<8.0 mg/dL  Up to 48 hours............<12.0 mg/dL  3-5 days..................<15.0 mg/dL  6-29 days.................<15.0 mg/dL       Alkaline Phosphatase   Date Value Ref Range Status   07/24/2024 91 55 - 135 U/L Final     AST   Date Value Ref Range Status   07/24/2024 20 10 - 40 U/L Final     ALT   Date Value Ref Range Status   07/24/2024 30 10 - 44 U/L Final     Anion Gap   Date Value Ref Range Status   07/24/2024 7 (L) 8 - 16 mmol/L Final      Lab Results   Component Value Date    WBC 11.88 07/24/2024    HGB 13.2 (L) 07/24/2024    HCT 43.1 07/24/2024    MCV 87 07/24/2024     07/24/2024        Lab Results   Component Value Date    CHOL 190 07/24/2024     Lab Results   Component Value Date    HDL 38 (L) 07/24/2024     Lab Results   Component Value Date    LDLCALC 128.6 07/24/2024     Lab Results   Component Value Date    TRIG 117 07/24/2024     Lab Results   Component Value Date    CHOLHDL 20.0 07/24/2024     Lab Results   Component Value Date    HGBA1C 5.6 07/24/2024     BP Readings from Last 1 Encounters:   07/23/24 138/79       Food History  reviewed    Exercise History:  reviewed    Cultural/Spiritual/Personal Preferences:  None identified    Support System:  family/friends    State of Change:   Preparation    Barriers to Change:  none     Diagnosis    obesity related to previous excess energy intake as evidenced by BMI 60.     Intervention    RMR (Method:  [place]):  3208 kcal  Activity Factor:  1.2    SANDRA:  3849 - 1500 = 2349 kcal    Goals:  1.  Continue current diet routine: 180-200 grams protein, 3000 calories  2.  Increase physical activity as able      Nutrition Education  The following education was provided to the patient:  Complimented patient on dietary compliance/modifications and resulting health improvements.  Discussed weight management.  Suggested dietary modifications based on current dietary behaviors and individual food preferences.  Provided ongoing support, encouragement, and guidance toward improved health efforts.    Patient verbalized understanding of nutrition education and recommendations received.    Handouts Provided  none    Monitoring/Evaluation    Monitor the following:  Weight  BMI  Caloric intake      Follow Up Plan:  2-3 months

## 2024-12-11 ENCOUNTER — CLINICAL SUPPORT (OUTPATIENT)
Dept: INTERNAL MEDICINE | Facility: CLINIC | Age: 34
End: 2024-12-11
Payer: COMMERCIAL

## 2024-12-11 DIAGNOSIS — E66.01 CLASS 3 SEVERE OBESITY WITH SERIOUS COMORBIDITY AND BODY MASS INDEX (BMI) OF 60.0 TO 69.9 IN ADULT, UNSPECIFIED OBESITY TYPE: Primary | ICD-10-CM

## 2024-12-11 DIAGNOSIS — E66.813 CLASS 3 SEVERE OBESITY WITH SERIOUS COMORBIDITY AND BODY MASS INDEX (BMI) OF 60.0 TO 69.9 IN ADULT, UNSPECIFIED OBESITY TYPE: Primary | ICD-10-CM

## 2025-02-12 ENCOUNTER — CLINICAL SUPPORT (OUTPATIENT)
Dept: INTERNAL MEDICINE | Facility: CLINIC | Age: 35
End: 2025-02-12
Payer: COMMERCIAL

## 2025-02-12 DIAGNOSIS — E66.01 CLASS 3 SEVERE OBESITY WITH SERIOUS COMORBIDITY AND BODY MASS INDEX (BMI) OF 60.0 TO 69.9 IN ADULT, UNSPECIFIED OBESITY TYPE: Primary | ICD-10-CM

## 2025-02-12 DIAGNOSIS — E66.813 CLASS 3 SEVERE OBESITY WITH SERIOUS COMORBIDITY AND BODY MASS INDEX (BMI) OF 60.0 TO 69.9 IN ADULT, UNSPECIFIED OBESITY TYPE: Primary | ICD-10-CM

## 2025-02-12 NOTE — PROGRESS NOTES
The patient location is: [place]  The chief complaint leading to consultation is: nutrition follow-up    Visit type: audiovisual    Face to Face time with patient: 31 minutes  36 minutes of total time spent on the encounter, which includes face to face time and non-face to face time preparing to see the patient (eg, review of tests), Obtaining and/or reviewing separately obtained history, Documenting clinical information in the electronic or other health record, Independently interpreting results (not separately reported) and communicating results to the patient/family/caregiver, or Care coordination (not separately reported).         Each patient to whom he or she provides medical services by telemedicine is:  (1) informed of the relationship between the physician and patient and the respective role of any other health care provider with respect to management of the patient; and (2) notified that he or she may decline to receive medical services by telemedicine and may withdraw from such care at any time.    Notes:   Nutrition Assessment        Client name:  Wero Tovar  :  1990  Age:  34 y.o.  Gender:  male    Client states:  Present for nutrition follow-up.  Last seen: 2024    Continues to work towards weight loss.  Weight loss has slowed down which may be a result of starting weight lifting 3 times per week now.  Although weight on scale is not changing much, states he is seeing changes in various areas of his body and also is on the last notch of his belt.    Increased around to 487-488 lbs around the holidays.  Different food choices during holidays. Not tracking as thoroughly during that time. Estimates to consume 3600 calories.    Back on track as of early-mid January.  Now: reaching protein intake daily (180-200 grams) and 3000 calories.  Drinks 10-14 cups of water per day.  Reduced root beer intake more. Last one was couple weeks ago.    Wants to maintain protein and calories for the  "time being.  Will reassess at next appointment.    Sample protein drink:1 scoop Body fortress + 2 Tbsp pb fit + fruit  Incorporating cottage cheese into various recipes.    Completed physical therapy.  Last knee injection middle of January.  Pain not as bad as what it previously was.   Swelling continues.    Anthropometrics  Height:  75"     Weight:  481 lbs   12-: 480 lbs (self reported)              10-: 486 lbs (self reported)              8-: 503 lbs (self reported)              7-: 506 lbs (office)  BMI:  60.2  % Body Fat:  n/a    Clinical Signs/Symptoms  N/V/D:  none noted  Appetite:  good       Past Medical History:   Diagnosis Date    Allergy     Anxiety        Past Surgical History:   Procedure Laterality Date    COLONOSCOPY      FRACTURE SURGERY         Medications    has a current medication list which includes the following prescription(s): gabapentin, hydrocodone-acetaminophen, meloxicam, methocarbamol, olmesartan, and zepbound.    Vitamins, Minerals, and/or Supplements:  not discussed     Food/Medication Interactions:  Reviewed     Food Allergies or Intolerances:  NKFA noted in chart     Social History    Marital status:  Single  Employment:  yes    Social History     Tobacco Use    Smoking status: Former     Types: Cigarettes     Passive exposure: Never    Smokeless tobacco: Never   Substance Use Topics    Alcohol use: Yes        Lab Reports   Sodium   Date Value Ref Range Status   07/24/2024 141 136 - 145 mmol/L Final     Potassium   Date Value Ref Range Status   07/24/2024 4.7 3.5 - 5.1 mmol/L Final     Chloride   Date Value Ref Range Status   07/24/2024 107 95 - 110 mmol/L Final     CO2   Date Value Ref Range Status   07/24/2024 27 23 - 29 mmol/L Final     Glucose   Date Value Ref Range Status   07/24/2024 115 (H) 70 - 110 mg/dL Final     BUN   Date Value Ref Range Status   07/24/2024 9 6 - 20 mg/dL Final     Creatinine   Date Value Ref Range Status   07/24/2024 0.9 0.5 " - 1.4 mg/dL Final     Calcium   Date Value Ref Range Status   07/24/2024 9.2 8.7 - 10.5 mg/dL Final     Total Protein   Date Value Ref Range Status   07/24/2024 6.7 6.0 - 8.4 g/dL Final     Albumin   Date Value Ref Range Status   07/24/2024 3.6 3.5 - 5.2 g/dL Final   07/24/2024 3.8 3.6 - 5.1 g/dL Final     Comment:     Test Performed at:  [facility]  [address] [place]  [id number]     JENARO Jernigan MD, PhD, AUGUSTINE       Total Bilirubin   Date Value Ref Range Status   07/24/2024 0.5 0.1 - 1.0 mg/dL Final     Comment:     For infants and newborns, interpretation of results should be based  on gestational age, weight and in agreement with clinical  observations.    Premature Infant recommended reference ranges:  Up to 24 hours.............<8.0 mg/dL  Up to 48 hours............<12.0 mg/dL  3-5 days..................<15.0 mg/dL  6-29 days.................<15.0 mg/dL       Alkaline Phosphatase   Date Value Ref Range Status   07/24/2024 91 55 - 135 U/L Final     AST   Date Value Ref Range Status   07/24/2024 20 10 - 40 U/L Final     ALT   Date Value Ref Range Status   07/24/2024 30 10 - 44 U/L Final     Anion Gap   Date Value Ref Range Status   07/24/2024 7 (L) 8 - 16 mmol/L Final      Lab Results   Component Value Date    WBC 11.88 07/24/2024    HGB 13.2 (L) 07/24/2024    HCT 43.1 07/24/2024    MCV 87 07/24/2024     07/24/2024        Lab Results   Component Value Date    CHOL 190 07/24/2024     Lab Results   Component Value Date    HDL 38 (L) 07/24/2024     Lab Results   Component Value Date    LDLCALC 128.6 07/24/2024     Lab Results   Component Value Date    TRIG 117 07/24/2024     Lab Results   Component Value Date    CHOLHDL 20.0 07/24/2024     Lab Results   Component Value Date    HGBA1C 5.6 07/24/2024     BP Readings from Last 1 Encounters:   07/23/24 138/79       Food History  reviewed    Exercise History:  reviewed    Cultural/Spiritual/Personal Preferences:   None identified    Support System:  family/friends    State of Change:  Preparation    Barriers to Change:  none    Diagnosis    Continued: obesity related to previous excess energy intake as evidenced by BMI 60.         Intervention    RMR (Method:  [place]):  3213 kcal  Activity Factor:  1.3    SANDRA:  4176 - 1500 = 2676 kcal    Goals:  1.  Continue current diet routine: 180-200 grams protein, 3000 calories  2.  Increase physical activity as able    Nutrition Education  The following education was provided to the patient:  Complimented patient on proactive role in health maintenance.  Complimented patient on physical activity efforts.  Discussed weight management.  Suggested dietary modifications based on current dietary behaviors and individual food preferences.  Discussed importance of small behavior/habit changes in improving long-term adherence and sustainability.  Provided ongoing support, encouragement, and guidance toward improved health efforts.     Patient verbalized understanding of nutrition education and recommendations received.    Handouts Provided  none    Monitoring/Evaluation    Monitor the following:  Weight  BMI  Caloric intake      Follow Up Plan:  2-3 months

## 2025-05-14 ENCOUNTER — CLINICAL SUPPORT (OUTPATIENT)
Dept: INTERNAL MEDICINE | Facility: CLINIC | Age: 35
End: 2025-05-14
Payer: COMMERCIAL

## 2025-05-14 DIAGNOSIS — E66.813 CLASS 3 SEVERE OBESITY WITH SERIOUS COMORBIDITY AND BODY MASS INDEX (BMI) OF 50.0 TO 59.9 IN ADULT, UNSPECIFIED OBESITY TYPE: Primary | ICD-10-CM

## 2025-05-14 DIAGNOSIS — E66.01 CLASS 3 SEVERE OBESITY WITH SERIOUS COMORBIDITY AND BODY MASS INDEX (BMI) OF 50.0 TO 59.9 IN ADULT, UNSPECIFIED OBESITY TYPE: Primary | ICD-10-CM

## 2025-05-14 NOTE — PROGRESS NOTES
"The patient location is: [place]  The chief complaint leading to consultation is: nutrition follow-up    Visit type: audiovisual    Face to Face time with patient: 29 minutes  34 minutes of total time spent on the encounter, which includes face to face time and non-face to face time preparing to see the patient (eg, review of tests), Obtaining and/or reviewing separately obtained history, Documenting clinical information in the electronic or other health record, Independently interpreting results (not separately reported) and communicating results to the patient/family/caregiver, or Care coordination (not separately reported).         Each patient to whom he or she provides medical services by telemedicine is:  (1) informed of the relationship between the physician and patient and the respective role of any other health care provider with respect to management of the patient; and (2) notified that he or she may decline to receive medical services by telemedicine and may withdraw from such care at any time.    Notes:                     Nutrition Assessment        Client name:  Wero Tovra  :  1990  Age:  34 y.o.  Gender:  male    Client states:  present for nutrition follow-up.  Last seen 2025    Continues with goal to lose weight.  Back on track since last visit.  Tracking calories and protein daily.  Reduced overall sugar intake to 50-80 grams per day which has helped to decrease appetite.      Couple days per week at 4555-9106 calories per day as long protein is being met.   Other days may consume 3000 calories especially towards the end of the week when looking for quick option due to not meal prepping on those days.    Continue protein supplement. Body Fortress + banana + 2 Tbsp pb fit   [employer] cheese crackers     Slight weight increase in April but has since decreased.    Exercise: inconsistent// stretching, aerobics         Anthropometrics  Height:  75"     Weight:  470 lb (2025 self " reported)   2-: 481 lbs              12-: 480 lbs (self reported)              10-: 486 lbs (self reported)              8-: 503 lbs (self reported)              7-: 506 lbs (office)  BMI:  58.9  % Body Fat:  n/a    Clinical Signs/Symptoms  N/V/D:  none noted  Appetite:  good       Past Medical History:   Diagnosis Date    Allergy     Anxiety        Past Surgical History:   Procedure Laterality Date    COLONOSCOPY      FRACTURE SURGERY         Medications    has a current medication list which includes the following prescription(s): gabapentin, hydrocodone-acetaminophen, meloxicam, methocarbamol, olmesartan, and zepbound.    Vitamins, Minerals, and/or Supplements:  not discussed     Food/Medication Interactions:  Reviewed     Food Allergies or Intolerances:  NKFA noted in chart     Social History    Marital status:  Single  Employment:  yes    Social History     Tobacco Use    Smoking status: Former     Types: Cigarettes     Passive exposure: Never    Smokeless tobacco: Never   Substance Use Topics    Alcohol use: Yes        Lab Reports   Sodium   Date Value Ref Range Status   07/24/2024 141 136 - 145 mmol/L Final     Potassium   Date Value Ref Range Status   07/24/2024 4.7 3.5 - 5.1 mmol/L Final     Chloride   Date Value Ref Range Status   07/24/2024 107 95 - 110 mmol/L Final     CO2   Date Value Ref Range Status   07/24/2024 27 23 - 29 mmol/L Final     Glucose   Date Value Ref Range Status   07/24/2024 115 (H) 70 - 110 mg/dL Final     BUN   Date Value Ref Range Status   07/24/2024 9 6 - 20 mg/dL Final     Creatinine   Date Value Ref Range Status   07/24/2024 0.9 0.5 - 1.4 mg/dL Final     Calcium   Date Value Ref Range Status   07/24/2024 9.2 8.7 - 10.5 mg/dL Final     Total Protein   Date Value Ref Range Status   07/24/2024 6.7 6.0 - 8.4 g/dL Final     Albumin   Date Value Ref Range Status   07/24/2024 3.6 3.5 - 5.2 g/dL Final   07/24/2024 3.8 3.6 - 5.1 g/dL Final     Comment:      Test Performed at:  [facility]  [address] [place]  [phone]     JENARO Jernigan MD, PhD, AUGUSTINE       Total Bilirubin   Date Value Ref Range Status   07/24/2024 0.5 0.1 - 1.0 mg/dL Final     Comment:     For infants and newborns, interpretation of results should be based  on gestational age, weight and in agreement with clinical  observations.    Premature Infant recommended reference ranges:  Up to 24 hours.............<8.0 mg/dL  Up to 48 hours............<12.0 mg/dL  3-5 days..................<15.0 mg/dL  6-29 days.................<15.0 mg/dL       Alkaline Phosphatase   Date Value Ref Range Status   07/24/2024 91 55 - 135 U/L Final     AST   Date Value Ref Range Status   07/24/2024 20 10 - 40 U/L Final     ALT   Date Value Ref Range Status   07/24/2024 30 10 - 44 U/L Final     Anion Gap   Date Value Ref Range Status   07/24/2024 7 (L) 8 - 16 mmol/L Final      Lab Results   Component Value Date    WBC 11.88 07/24/2024    HGB 13.2 (L) 07/24/2024    HCT 43.1 07/24/2024    MCV 87 07/24/2024     07/24/2024        Lab Results   Component Value Date    CHOL 190 07/24/2024     Lab Results   Component Value Date    HDL 38 (L) 07/24/2024     Lab Results   Component Value Date    LDLCALC 128.6 07/24/2024     Lab Results   Component Value Date    TRIG 117 07/24/2024     Lab Results   Component Value Date    CHOLHDL 20.0 07/24/2024     Lab Results   Component Value Date    HGBA1C 5.6 07/24/2024     BP Readings from Last 1 Encounters:   07/23/24 138/79       Food History  reviewed    Exercise History:  reviewed    Cultural/Spiritual/Personal Preferences:  None identified    Support System:  family/friends    State of Change:  Action    Barriers to Change:  none    Diagnosis    obesity related to previous excess energy intake as evidenced by BMI 58.    Intervention    RMR (Method:  [place]):  3163 kcal  Activity Factor:  1.3    SANDRA:  4111 - 1500 = 2600  kcal    Goals:  1.  8777-4520 calories per day. Continue same protein 180-200 grams per day  2.  Increase physical activity      Nutrition Education  The following education was provided to the patient:  Complimented patient on dietary compliance/modifications and resulting health improvements.  Discussed weight management.  Suggested dietary modifications based on current dietary behaviors and individual food preferences.  Provided ongoing support, encouragement, and guidance toward improved health efforts.    Patient verbalized understanding of nutrition education and recommendations received.    Handouts Provided  none    Monitoring/Evaluation    Monitor the following:  Weight  BMI  Caloric intake      Follow Up Plan:  2-3 months

## 2025-06-20 ENCOUNTER — LAB VISIT (OUTPATIENT)
Dept: LAB | Facility: HOSPITAL | Age: 35
End: 2025-06-20
Attending: FAMILY MEDICINE
Payer: COMMERCIAL

## 2025-06-20 ENCOUNTER — OFFICE VISIT (OUTPATIENT)
Dept: INTERNAL MEDICINE | Facility: CLINIC | Age: 35
End: 2025-06-20
Payer: COMMERCIAL

## 2025-06-20 VITALS
TEMPERATURE: 97 F | DIASTOLIC BLOOD PRESSURE: 84 MMHG | SYSTOLIC BLOOD PRESSURE: 150 MMHG | OXYGEN SATURATION: 95 % | HEART RATE: 82 BPM | HEIGHT: 75 IN | BODY MASS INDEX: 39.17 KG/M2 | WEIGHT: 315 LBS

## 2025-06-20 DIAGNOSIS — I10 HYPERTENSION, UNSPECIFIED TYPE: ICD-10-CM

## 2025-06-20 DIAGNOSIS — M54.50 CHRONIC RIGHT-SIDED LOW BACK PAIN WITHOUT SCIATICA: ICD-10-CM

## 2025-06-20 DIAGNOSIS — M25.562 LEFT KNEE PAIN, UNSPECIFIED CHRONICITY: ICD-10-CM

## 2025-06-20 DIAGNOSIS — M25.511 ACUTE PAIN OF RIGHT SHOULDER: ICD-10-CM

## 2025-06-20 DIAGNOSIS — Z00.00 ROUTINE ADULT HEALTH MAINTENANCE: Primary | ICD-10-CM

## 2025-06-20 DIAGNOSIS — Z00.00 ROUTINE ADULT HEALTH MAINTENANCE: ICD-10-CM

## 2025-06-20 DIAGNOSIS — E66.813 CLASS 3 SEVERE OBESITY DUE TO EXCESS CALORIES WITH SERIOUS COMORBIDITY AND BODY MASS INDEX (BMI) OF 50.0 TO 59.9 IN ADULT: ICD-10-CM

## 2025-06-20 DIAGNOSIS — E66.01 CLASS 3 SEVERE OBESITY DUE TO EXCESS CALORIES WITH SERIOUS COMORBIDITY AND BODY MASS INDEX (BMI) OF 50.0 TO 59.9 IN ADULT: ICD-10-CM

## 2025-06-20 DIAGNOSIS — G89.29 CHRONIC RIGHT-SIDED LOW BACK PAIN WITHOUT SCIATICA: ICD-10-CM

## 2025-06-20 DIAGNOSIS — Z87.442 PERSONAL HISTORY OF KIDNEY STONES: ICD-10-CM

## 2025-06-20 LAB
ABSOLUTE EOSINOPHIL (OHS): 0.36 K/UL
ABSOLUTE MONOCYTE (OHS): 1.08 K/UL (ref 0.3–1)
ABSOLUTE NEUTROPHIL COUNT (OHS): 7.53 K/UL (ref 1.8–7.7)
ALBUMIN SERPL BCP-MCNC: 3.9 G/DL (ref 3.5–5.2)
ALP SERPL-CCNC: 89 UNIT/L (ref 40–150)
ALT SERPL W/O P-5'-P-CCNC: 30 UNIT/L (ref 10–44)
ANION GAP (OHS): 7 MMOL/L (ref 8–16)
AST SERPL-CCNC: 18 UNIT/L (ref 11–45)
BASOPHILS # BLD AUTO: 0.06 K/UL
BASOPHILS NFR BLD AUTO: 0.5 %
BILIRUB SERPL-MCNC: 0.4 MG/DL (ref 0.1–1)
BUN SERPL-MCNC: 11 MG/DL (ref 6–20)
CALCIUM SERPL-MCNC: 9.1 MG/DL (ref 8.7–10.5)
CHLORIDE SERPL-SCNC: 104 MMOL/L (ref 95–110)
CHOLEST SERPL-MCNC: 197 MG/DL (ref 120–199)
CHOLEST/HDLC SERPL: 5.6 {RATIO} (ref 2–5)
CO2 SERPL-SCNC: 30 MMOL/L (ref 23–29)
CREAT SERPL-MCNC: 1 MG/DL (ref 0.5–1.4)
EAG (OHS): 108 MG/DL (ref 68–131)
ERYTHROCYTE [DISTWIDTH] IN BLOOD BY AUTOMATED COUNT: 13.6 % (ref 11.5–14.5)
GFR SERPLBLD CREATININE-BSD FMLA CKD-EPI: >60 ML/MIN/1.73/M2
GLUCOSE SERPL-MCNC: 94 MG/DL (ref 70–110)
HBA1C MFR BLD: 5.4 % (ref 4–5.6)
HCT VFR BLD AUTO: 42.3 % (ref 40–54)
HDLC SERPL-MCNC: 35 MG/DL (ref 40–75)
HDLC SERPL: 17.8 % (ref 20–50)
HGB BLD-MCNC: 13.1 GM/DL (ref 14–18)
IMM GRANULOCYTES # BLD AUTO: 0.06 K/UL (ref 0–0.04)
IMM GRANULOCYTES NFR BLD AUTO: 0.5 % (ref 0–0.5)
LDLC SERPL CALC-MCNC: 141.4 MG/DL (ref 63–159)
LYMPHOCYTES # BLD AUTO: 2.18 K/UL (ref 1–4.8)
MCH RBC QN AUTO: 26.7 PG (ref 27–31)
MCHC RBC AUTO-ENTMCNC: 31 G/DL (ref 32–36)
MCV RBC AUTO: 86 FL (ref 82–98)
NONHDLC SERPL-MCNC: 162 MG/DL
NUCLEATED RBC (/100WBC) (OHS): 0 /100 WBC
PLATELET # BLD AUTO: 233 K/UL (ref 150–450)
PMV BLD AUTO: 12.6 FL (ref 9.2–12.9)
POTASSIUM SERPL-SCNC: 4.7 MMOL/L (ref 3.5–5.1)
PROT SERPL-MCNC: 7.4 GM/DL (ref 6–8.4)
RBC # BLD AUTO: 4.91 M/UL (ref 4.6–6.2)
RELATIVE EOSINOPHIL (OHS): 3.2 %
RELATIVE LYMPHOCYTE (OHS): 19.3 % (ref 18–48)
RELATIVE MONOCYTE (OHS): 9.6 % (ref 4–15)
RELATIVE NEUTROPHIL (OHS): 66.9 % (ref 38–73)
SODIUM SERPL-SCNC: 141 MMOL/L (ref 136–145)
TRIGL SERPL-MCNC: 103 MG/DL (ref 30–150)
WBC # BLD AUTO: 11.27 K/UL (ref 3.9–12.7)

## 2025-06-20 PROCEDURE — 99999 PR PBB SHADOW E&M-EST. PATIENT-LVL III: CPT | Mod: PBBFAC,,, | Performed by: FAMILY MEDICINE

## 2025-06-20 PROCEDURE — 85025 COMPLETE CBC W/AUTO DIFF WBC: CPT

## 2025-06-20 PROCEDURE — 83036 HEMOGLOBIN GLYCOSYLATED A1C: CPT

## 2025-06-20 PROCEDURE — 36415 COLL VENOUS BLD VENIPUNCTURE: CPT | Mod: PO

## 2025-06-20 PROCEDURE — 84075 ASSAY ALKALINE PHOSPHATASE: CPT

## 2025-06-20 PROCEDURE — 80061 LIPID PANEL: CPT

## 2025-06-20 RX ORDER — HYDROCODONE BITARTRATE AND ACETAMINOPHEN 10; 325 MG/1; MG/1
1 TABLET ORAL EVERY 8 HOURS PRN
Qty: 24 TABLET | Refills: 0 | Status: CANCELLED | OUTPATIENT
Start: 2025-06-20

## 2025-06-20 RX ORDER — MELOXICAM 15 MG/1
15 TABLET ORAL DAILY
Qty: 30 TABLET | Refills: 4 | Status: SHIPPED | OUTPATIENT
Start: 2025-06-20

## 2025-06-20 RX ORDER — HYDROCODONE BITARTRATE AND ACETAMINOPHEN 10; 325 MG/1; MG/1
1 TABLET ORAL EVERY 8 HOURS PRN
Qty: 20 TABLET | Refills: 0 | Status: SHIPPED | OUTPATIENT
Start: 2025-06-20

## 2025-06-20 RX ORDER — OLMESARTAN MEDOXOMIL 20 MG/1
20 TABLET ORAL DAILY
Qty: 30 TABLET | Refills: 0 | Status: SHIPPED | OUTPATIENT
Start: 2025-06-20

## 2025-06-20 NOTE — PROGRESS NOTES
Subjective:      Patient ID: Wero Tovar is a 34 y.o. male.    Chief Complaint: Annual Exam and Medication Refill      History of Present Illness    CHIEF COMPLAINT:  Mr. Tovar presents today for follow up    RECENT INJURIES:  He fell 3-4 feet off stairs approximately 1.5-2 months ago, landing on his knees and right side. His left leg became jammed into his hip, requiring assistance to move the leg. He required crutches for mobility initially. The leg has improved with reduced swelling and pain since the initial injury.    MUSCULOSKELETAL:  He has ongoing knee issues with persistent inflammation and pain, particularly during strenuous activities. MRI showed missing cartilage in the knee. He completed physical therapy until February this year and received three weekly injections for treatment. While noting some improvement, his knee remains sensitive with excessive movement. He also reports arm pain for two months, particularly when clenching his fist and during weightlifting movements, with sharp shooting sensations during upward motions. He attributes this to possible overuse of  strengtheners. The arm pain has improved to mild/moderate intensity.    GENITOURINARY:  Following his recent fall, he experienced episodes of hematuria and passed kidney stones on two separate occasions. He has an upcoming CT W Contrast to evaluate kidney function and potential kidney damage.    CARDIAC:  He reports a 15-year history of occasional irregular heartbeat since high school, describing it as heart pumping differently momentarily before returning to normal rhythm. He denies associated chest pain, shortness of breath, or other cardiac symptoms.    WEIGHT MANAGEMENT:  He has achieved intentional weight loss of approximately 36-39 lbs since last July, working with Dr. Jack on this goal.    CURRENT MEDICATIONS:  He takes Gabapentin and Robaxin as needed for pain management. He uses Hydrocodone for knee pain and potential  "kidney stone pain. He currently has a full bottle of Gabapentin.        Past Medical History:   Diagnosis Date    Allergy     Anxiety           Past Surgical History:   Procedure Laterality Date    COLONOSCOPY      FRACTURE SURGERY       Family History   Problem Relation Name Age of Onset    Diabetes Mother      Cancer Father      Hypertension Brother      Hypertension Maternal Grandmother      Diabetes Maternal Grandmother       Social History[1]  Review of patient's allergies indicates:   Allergen Reactions    Opioids - morphine analogues Anaphylaxis    Morphine Other (See Comments)     Feeling hot all over and shaking. Patient would rather not have medication       Review of Systems   Constitutional:  Positive for weight loss (intentional). Negative for chills, fever and malaise/fatigue.   HENT:  Negative for congestion.    Respiratory:  Negative for cough and shortness of breath.    Cardiovascular:  Negative for chest pain and palpitations.   Gastrointestinal:  Negative for abdominal pain and nausea.   Musculoskeletal:  Positive for back pain and joint pain. Negative for myalgias.   Skin:  Negative for rash.     Objective:       BP (!) 150/84 (BP Location: Left arm, Patient Position: Sitting)   Pulse 82   Temp 96.8 °F (36 °C) (Tympanic)   Ht 6' 3" (1.905 m)   Wt (!) 214.8 kg (473 lb 8.8 oz)   SpO2 95%   BMI 59.19 kg/m²   Physical Exam    Cardiovascular: All normal.  Lungs: All normal.        Physical Exam  Constitutional:       General: He is not in acute distress.     Appearance: Normal appearance. He is well-developed. He is obese. He is not ill-appearing or diaphoretic.   HENT:      Right Ear: Tympanic membrane, ear canal and external ear normal. There is no impacted cerumen.      Left Ear: Tympanic membrane, ear canal and external ear normal. There is no impacted cerumen.      Mouth/Throat:      Pharynx: No posterior oropharyngeal erythema.   Cardiovascular:      Rate and Rhythm: Normal rate and " regular rhythm.      Heart sounds: Normal heart sounds.   Pulmonary:      Effort: Pulmonary effort is normal.      Breath sounds: Normal breath sounds.   Musculoskeletal:      Right lower leg: No edema.      Left lower leg: No edema.   Neurological:      General: No focal deficit present.      Mental Status: He is alert and oriented to person, place, and time.   Psychiatric:         Mood and Affect: Mood normal.         Behavior: Behavior normal.         Thought Content: Thought content normal.         Judgment: Judgment normal.         Assessment:     1. Routine adult health maintenance    2. Hypertension, unspecified type    3. Chronic right-sided low back pain without sciatica    4. Personal history of kidney stones    5. Acute pain of right shoulder    6. Left knee pain, unspecified chronicity    7. Class 3 severe obesity due to excess calories with serious comorbidity and body mass index (BMI) of 50.0 to 59.9 in adult      Plan:   Assessment & Plan    MEDICAL DECISION MAKING:  - Assessed BP medication dosage and continued BP medication.  - Evaluated knee condition, noting history of physical therapy and recent MRI revealing missing cartilage.  - Considered recent kidney stone episodes and potential kidney damage, awaiting results of upcoming CT W Contrast.  - Assessed tendinitis in arm, likely due to overuse from  strengthening exercises.  - Evaluated occasional irregular heartbeat, determined to be likely benign given long-standing nature and infrequency.    PATIENT EDUCATION:  - Explained that infrequent, occasional irregular heartbeats are typically harmless and common.    ACTION ITEMS/LIFESTYLE:  - Mr. Topetes to ease off  strengthener exercises to allow arm tendinitis to heal.  - Recommend alternative exercises instead of those aggravating the tendinitis.    MEDICATIONS:  - Continued hydrocodone for pain management (knee, potential kidney stones, back pain).  - Started meloxicam (anti-inflammatory).  Take daily for 1-2 weeks to address arm tendinitis.    ORDERS:  - Ordered labs.    FOLLOW UP:  - Follow up with nurse to recheck BP and ensure correct medication dosage.        Routine adult health maintenance  -     Comprehensive Metabolic Panel; Future; Expected date: 06/20/2025  -     CBC Auto Differential; Future; Expected date: 06/20/2025  -     Lipid Panel; Future; Expected date: 06/20/2025  -     Hemoglobin A1C; Future; Expected date: 06/20/2025    Hypertension, unspecified type    Chronic right-sided low back pain without sciatica  -     HYDROcodone-acetaminophen (NORCO)  mg per tablet; Take 1 tablet by mouth every 8 (eight) hours as needed (worsened pain).  Dispense: 20 tablet; Refill: 0    Personal history of kidney stones  -     HYDROcodone-acetaminophen (NORCO)  mg per tablet; Take 1 tablet by mouth every 8 (eight) hours as needed (worsened pain).  Dispense: 20 tablet; Refill: 0    Acute pain of right shoulder  -     meloxicam (MOBIC) 15 MG tablet; Take 1 tablet (15 mg total) by mouth once daily. Take with meal  Dispense: 30 tablet; Refill: 4    Left knee pain, unspecified chronicity  -     meloxicam (MOBIC) 15 MG tablet; Take 1 tablet (15 mg total) by mouth once daily. Take with meal  Dispense: 30 tablet; Refill: 4    Class 3 severe obesity due to excess calories with serious comorbidity and body mass index (BMI) of 50.0 to 59.9 in adult    Other orders  -     olmesartan (BENICAR) 20 MG tablet; Take 1 tablet (20 mg total) by mouth once daily.  Dispense: 30 tablet; Refill: 0      Medication List with Changes/Refills   Current Medications    GABAPENTIN (NEURONTIN) 300 MG CAPSULE    TAKE 1 CAPSULE BY MOUTH THREE TIMES DAILY    METHOCARBAMOL (ROBAXIN) 500 MG TAB    Take 500 mg by mouth daily as needed.   Changed and/or Refilled Medications    Modified Medication Previous Medication    HYDROCODONE-ACETAMINOPHEN (NORCO)  MG PER TABLET HYDROcodone-acetaminophen (NORCO)  mg per tablet        Take 1 tablet by mouth every 8 (eight) hours as needed (worsened pain).    Take 1 tablet by mouth every 8 (eight) hours as needed for Pain.    MELOXICAM (MOBIC) 15 MG TABLET meloxicam (MOBIC) 15 MG tablet       Take 1 tablet (15 mg total) by mouth once daily. Take with meal    TAKE 1 TABLET BY MOUTH ONCE DAILY    OLMESARTAN (BENICAR) 20 MG TABLET olmesartan (BENICAR) 20 MG tablet       Take 1 tablet (20 mg total) by mouth once daily.    Take 20 mg by mouth once daily.   Discontinued Medications    TIRZEPATIDE, WEIGHT LOSS, (ZEPBOUND) 2.5 MG/0.5 ML PNIJ    Inject 2.5 mg into the skin every 7 days.       This note was generated with the assistance of ambient listening technology. Verbal consent was obtained by the patient and accompanying visitor(s) for the recording of patient appointment to facilitate this note. I attest to having reviewed and edited the generated note for accuracy, though some syntax or spelling errors may persist. Please contact the author of this note for any clarification.            [1]   Social History  Socioeconomic History    Marital status: Single   Tobacco Use    Smoking status: Former     Types: Cigarettes     Passive exposure: Never    Smokeless tobacco: Never   Substance and Sexual Activity    Alcohol use: Yes    Drug use: Never    Sexual activity: Not Currently     Social Drivers of Health     Financial Resource Strain: Patient Declined (2/12/2025)    Overall Financial Resource Strain (CARDIA)     Difficulty of Paying Living Expenses: Patient declined   Food Insecurity: Patient Declined (2/12/2025)    Hunger Vital Sign     Worried About Running Out of Food in the Last Year: Patient declined     Ran Out of Food in the Last Year: Patient declined   Transportation Needs: Patient Declined (2/12/2025)    PRAPARE - Transportation     Lack of Transportation (Medical): Patient declined     Lack of Transportation (Non-Medical): Patient declined   Physical Activity: Insufficiently Active  (2/12/2025)    Exercise Vital Sign     Days of Exercise per Week: 3 days     Minutes of Exercise per Session: 30 min   Stress: Patient Declined (2/12/2025)    [employer] - Occupational Stress Questionnaire     Feeling of Stress : Patient declined   Housing Stability: Patient Declined (2/12/2025)    Housing Stability Vital Sign     Unable to Pay for Housing in the Last Year: Patient declined     Number of Times Moved in the Last Year: 0     Homeless in the Last Year: Patient declined

## 2025-06-24 ENCOUNTER — RESULTS FOLLOW-UP (OUTPATIENT)
Dept: INTERNAL MEDICINE | Facility: CLINIC | Age: 35
End: 2025-06-24

## 2025-07-02 ENCOUNTER — CLINICAL SUPPORT (OUTPATIENT)
Dept: INTERNAL MEDICINE | Facility: CLINIC | Age: 35
End: 2025-07-02
Payer: COMMERCIAL

## 2025-07-02 VITALS — SYSTOLIC BLOOD PRESSURE: 126 MMHG | DIASTOLIC BLOOD PRESSURE: 84 MMHG

## 2025-07-02 DIAGNOSIS — I10 HYPERTENSION, UNSPECIFIED TYPE: Primary | ICD-10-CM

## 2025-07-02 PROCEDURE — 99999 PR PBB SHADOW E&M-EST. PATIENT-LVL I: CPT | Mod: PBBFAC,,,

## 2025-07-02 NOTE — PROGRESS NOTES
Patient to clinic for BP recheck. Patient allowed to sit for approx. 10 minutes prior to reading. One reading was taken of 126/84. Patient allowed to leave following one reading.

## 2025-08-13 ENCOUNTER — CLINICAL SUPPORT (OUTPATIENT)
Dept: INTERNAL MEDICINE | Facility: CLINIC | Age: 35
End: 2025-08-13
Payer: COMMERCIAL

## 2025-08-13 DIAGNOSIS — E66.813 CLASS 3 SEVERE OBESITY WITH SERIOUS COMORBIDITY AND BODY MASS INDEX (BMI) OF 50.0 TO 59.9 IN ADULT, UNSPECIFIED OBESITY TYPE: Primary | ICD-10-CM
